# Patient Record
Sex: FEMALE | URBAN - METROPOLITAN AREA
[De-identification: names, ages, dates, MRNs, and addresses within clinical notes are randomized per-mention and may not be internally consistent; named-entity substitution may affect disease eponyms.]

---

## 2018-11-14 ENCOUNTER — INPATIENT (INPATIENT)
Facility: HOSPITAL | Age: 33
LOS: 5 days | Discharge: HOME | DRG: 65 | End: 2018-11-20
Attending: PSYCHIATRY & NEUROLOGY | Admitting: PSYCHIATRY & NEUROLOGY
Payer: MEDICAID

## 2018-11-14 VITALS
DIASTOLIC BLOOD PRESSURE: 86 MMHG | SYSTOLIC BLOOD PRESSURE: 158 MMHG | RESPIRATION RATE: 16 BRPM | HEART RATE: 72 BPM | OXYGEN SATURATION: 100 %

## 2018-11-14 DIAGNOSIS — J45.20 MILD INTERMITTENT ASTHMA, UNCOMPLICATED: ICD-10-CM

## 2018-11-14 DIAGNOSIS — I10 ESSENTIAL (PRIMARY) HYPERTENSION: ICD-10-CM

## 2018-11-14 DIAGNOSIS — I63.9 CEREBRAL INFARCTION, UNSPECIFIED: ICD-10-CM

## 2018-11-14 DIAGNOSIS — Z29.9 ENCOUNTER FOR PROPHYLACTIC MEASURES, UNSPECIFIED: ICD-10-CM

## 2018-11-14 DIAGNOSIS — Z98.891 HISTORY OF UTERINE SCAR FROM PREVIOUS SURGERY: Chronic | ICD-10-CM

## 2018-11-14 DIAGNOSIS — Z91.89 OTHER SPECIFIED PERSONAL RISK FACTORS, NOT ELSEWHERE CLASSIFIED: ICD-10-CM

## 2018-11-14 DIAGNOSIS — R63.8 OTHER SYMPTOMS AND SIGNS CONCERNING FOOD AND FLUID INTAKE: ICD-10-CM

## 2018-11-14 DIAGNOSIS — B20 HUMAN IMMUNODEFICIENCY VIRUS [HIV] DISEASE: ICD-10-CM

## 2018-11-14 LAB
ANION GAP SERPL CALC-SCNC: 20 MMOL/L — HIGH (ref 5–17)
BUN SERPL-MCNC: 11 MG/DL — SIGNIFICANT CHANGE UP (ref 7–23)
CALCIUM SERPL-MCNC: 9.8 MG/DL — SIGNIFICANT CHANGE UP (ref 8.4–10.5)
CHLORIDE SERPL-SCNC: 97 MMOL/L — SIGNIFICANT CHANGE UP (ref 96–108)
CO2 SERPL-SCNC: 19 MMOL/L — LOW (ref 22–31)
CREAT SERPL-MCNC: 0.81 MG/DL — SIGNIFICANT CHANGE UP (ref 0.5–1.3)
CRP SERPL-MCNC: <0.03 MG/DL — SIGNIFICANT CHANGE UP (ref 0–0.4)
GLUCOSE SERPL-MCNC: 74 MG/DL — SIGNIFICANT CHANGE UP (ref 70–99)
HCT VFR BLD CALC: 33.7 % — LOW (ref 34.5–45)
HGB BLD-MCNC: 10.5 G/DL — LOW (ref 11.5–15.5)
MAGNESIUM SERPL-MCNC: 2 MG/DL — SIGNIFICANT CHANGE UP (ref 1.6–2.6)
MCHC RBC-ENTMCNC: 24.8 PG — LOW (ref 27–34)
MCHC RBC-ENTMCNC: 31.2 G/DL — LOW (ref 32–36)
MCV RBC AUTO: 79.5 FL — LOW (ref 80–100)
PLATELET # BLD AUTO: 229 K/UL — SIGNIFICANT CHANGE UP (ref 150–400)
POTASSIUM SERPL-MCNC: 3.7 MMOL/L — SIGNIFICANT CHANGE UP (ref 3.5–5.3)
POTASSIUM SERPL-SCNC: 3.7 MMOL/L — SIGNIFICANT CHANGE UP (ref 3.5–5.3)
RBC # BLD: 4.24 M/UL — SIGNIFICANT CHANGE UP (ref 3.8–5.2)
RBC # FLD: 16.3 % — SIGNIFICANT CHANGE UP (ref 10.3–16.9)
SODIUM SERPL-SCNC: 136 MMOL/L — SIGNIFICANT CHANGE UP (ref 135–145)
WBC # BLD: 3.4 K/UL — LOW (ref 3.8–10.5)
WBC # FLD AUTO: 3.4 K/UL — LOW (ref 3.8–10.5)

## 2018-11-14 PROCEDURE — 99223 1ST HOSP IP/OBS HIGH 75: CPT

## 2018-11-14 PROCEDURE — 93010 ELECTROCARDIOGRAM REPORT: CPT

## 2018-11-14 RX ORDER — MULTIVIT WITH MIN/MFOLATE/K2 340-15/3 G
200 POWDER (GRAM) ORAL ONCE
Qty: 0 | Refills: 0 | Status: DISCONTINUED | OUTPATIENT
Start: 2018-11-14 | End: 2018-11-15

## 2018-11-14 RX ORDER — DOCUSATE SODIUM 100 MG
1 CAPSULE ORAL
Qty: 0 | Refills: 0 | COMMUNITY

## 2018-11-14 RX ORDER — ALBUTEROL 90 UG/1
2 AEROSOL, METERED ORAL EVERY 6 HOURS
Qty: 0 | Refills: 0 | Status: DISCONTINUED | OUTPATIENT
Start: 2018-11-14 | End: 2018-11-20

## 2018-11-14 RX ORDER — ACETAMINOPHEN 500 MG
1000 TABLET ORAL ONCE
Qty: 0 | Refills: 0 | Status: COMPLETED | OUTPATIENT
Start: 2018-11-14 | End: 2018-11-14

## 2018-11-14 RX ORDER — DIBUCAINE 1 %
1 OINTMENT (GRAM) RECTAL ONCE
Qty: 0 | Refills: 0 | Status: COMPLETED | OUTPATIENT
Start: 2018-11-14 | End: 2018-11-14

## 2018-11-14 RX ORDER — POTASSIUM CHLORIDE 20 MEQ
20 PACKET (EA) ORAL ONCE
Qty: 0 | Refills: 0 | Status: COMPLETED | OUTPATIENT
Start: 2018-11-14 | End: 2018-11-14

## 2018-11-14 RX ORDER — ENOXAPARIN SODIUM 100 MG/ML
60 INJECTION SUBCUTANEOUS ONCE
Qty: 0 | Refills: 0 | Status: COMPLETED | OUTPATIENT
Start: 2018-11-14 | End: 2018-11-14

## 2018-11-14 RX ORDER — DOCUSATE SODIUM 100 MG
100 CAPSULE ORAL THREE TIMES A DAY
Qty: 0 | Refills: 0 | Status: DISCONTINUED | OUTPATIENT
Start: 2018-11-14 | End: 2018-11-20

## 2018-11-14 RX ORDER — LANOLIN ALCOHOL/MO/W.PET/CERES
3 CREAM (GRAM) TOPICAL ONCE
Qty: 0 | Refills: 0 | Status: COMPLETED | OUTPATIENT
Start: 2018-11-14 | End: 2018-11-14

## 2018-11-14 RX ORDER — POLYETHYLENE GLYCOL 3350 17 G/17G
17 POWDER, FOR SOLUTION ORAL EVERY 12 HOURS
Qty: 0 | Refills: 0 | Status: DISCONTINUED | OUTPATIENT
Start: 2018-11-14 | End: 2018-11-20

## 2018-11-14 RX ORDER — ELVITEGRAVIR, COBICISTAT, EMTRICITABINE, AND TENOFOVIR ALAFENAMIDE 150; 150; 200; 10 MG/1; MG/1; MG/1; MG/1
1 TABLET ORAL DAILY
Qty: 0 | Refills: 0 | Status: DISCONTINUED | OUTPATIENT
Start: 2018-11-15 | End: 2018-11-20

## 2018-11-14 RX ORDER — ATORVASTATIN CALCIUM 80 MG/1
80 TABLET, FILM COATED ORAL AT BEDTIME
Qty: 0 | Refills: 0 | Status: DISCONTINUED | OUTPATIENT
Start: 2018-11-14 | End: 2018-11-20

## 2018-11-14 RX ORDER — ELVITEGRAVIR, COBICISTAT, EMTRICITABINE, AND TENOFOVIR ALAFENAMIDE 150; 150; 200; 10 MG/1; MG/1; MG/1; MG/1
1 TABLET ORAL
Qty: 0 | Refills: 0 | COMMUNITY

## 2018-11-14 RX ORDER — ACETAMINOPHEN 500 MG
650 TABLET ORAL ONCE
Qty: 0 | Refills: 0 | Status: COMPLETED | OUTPATIENT
Start: 2018-11-14 | End: 2018-11-14

## 2018-11-14 RX ORDER — AMLODIPINE BESYLATE 2.5 MG/1
5 TABLET ORAL DAILY
Qty: 0 | Refills: 0 | Status: DISCONTINUED | OUTPATIENT
Start: 2018-11-15 | End: 2018-11-20

## 2018-11-14 RX ORDER — HEPARIN SODIUM 5000 [USP'U]/ML
5000 INJECTION INTRAVENOUS; SUBCUTANEOUS EVERY 8 HOURS
Qty: 0 | Refills: 0 | Status: DISCONTINUED | OUTPATIENT
Start: 2018-11-14 | End: 2018-11-14

## 2018-11-14 RX ORDER — VALACYCLOVIR 500 MG/1
500 TABLET, FILM COATED ORAL DAILY
Qty: 0 | Refills: 0 | Status: DISCONTINUED | OUTPATIENT
Start: 2018-11-14 | End: 2018-11-15

## 2018-11-14 RX ORDER — AMLODIPINE BESYLATE 2.5 MG/1
1 TABLET ORAL
Qty: 0 | Refills: 0 | COMMUNITY

## 2018-11-14 RX ORDER — SENNA PLUS 8.6 MG/1
2 TABLET ORAL AT BEDTIME
Qty: 0 | Refills: 0 | Status: DISCONTINUED | OUTPATIENT
Start: 2018-11-14 | End: 2018-11-20

## 2018-11-14 RX ORDER — ASPIRIN/CALCIUM CARB/MAGNESIUM 324 MG
81 TABLET ORAL DAILY
Qty: 0 | Refills: 0 | Status: DISCONTINUED | OUTPATIENT
Start: 2018-11-14 | End: 2018-11-17

## 2018-11-14 RX ADMIN — Medication 1000 MILLIGRAM(S): at 22:27

## 2018-11-14 RX ADMIN — ATORVASTATIN CALCIUM 80 MILLIGRAM(S): 80 TABLET, FILM COATED ORAL at 21:52

## 2018-11-14 RX ADMIN — Medication 20 MILLIEQUIVALENT(S): at 18:19

## 2018-11-14 RX ADMIN — Medication 1000 MILLIGRAM(S): at 22:28

## 2018-11-14 RX ADMIN — ENOXAPARIN SODIUM 60 MILLIGRAM(S): 100 INJECTION SUBCUTANEOUS at 19:38

## 2018-11-14 RX ADMIN — POLYETHYLENE GLYCOL 3350 17 GRAM(S): 17 POWDER, FOR SOLUTION ORAL at 18:19

## 2018-11-14 RX ADMIN — Medication 3 MILLIGRAM(S): at 22:28

## 2018-11-14 RX ADMIN — Medication 1 APPLICATION(S): at 22:49

## 2018-11-14 NOTE — H&P ADULT - PMH
Essential hypertension    HIV (human immunodeficiency virus infection)    Mild intermittent asthma without complication    Stroke  in 2013 x2

## 2018-11-14 NOTE — H&P ADULT - NSHPLABSRESULTS_GEN_ALL_CORE
.  LABS:                         RADIOLOGY, EKG & ADDITIONAL TESTS: Reviewed. .  LABS:     11-14    136  |  97  |  11  ----------------------------<  74  3.7   |  19<L>  |  0.81    Ca    9.8      14 Nov 2018 16:44  Mg     2.0     11-14                    RADIOLOGY, EKG & ADDITIONAL TESTS: Reviewed.

## 2018-11-14 NOTE — H&P ADULT - PROBLEM SELECTOR PLAN 1
Admitted 2 days ago to [place] with headache that has since resolved. 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 According to pt, no new deficits. Hx of 2 strokes in 2013.  - Echo at [facility] showed normal EF, no evidence of thrombus  - PT/OT consulted  - will c/w atorva 80 in place of rosuvastatin  - EKG unremarkable  - c/w ASA Admitted 2 days ago to [place] with headache that has since resolved. 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 According to pt, no new deficits. Hx of 2 strokes in 2013.  - Echo at [place] showed normal EF, no evidence of thrombus  - PT/OT consulted  - will c/w atorva 80 in place of rosuvastatin  - EKG unremarkable  - c/w ASA  - 1mg/kg of lovenox sQ Admitted 2 days ago to [place] with headache that has since resolved.  NIHH 7 . 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 According to pt, no new deficits. Hx of 2 strokes in 2013.  - Echo at [place] showed normal EF, no evidence of thrombus  - PT/OT consulted  - will c/w atorva 80 in place of rosuvastatin  - EKG unremarkable  - c/w ASA  - 1mg/kg of lovenox sQ

## 2018-11-14 NOTE — H&P ADULT - ASSESSMENT
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

## 2018-11-14 NOTE — H&P ADULT - PROBLEM SELECTOR PLAN 7
1) PCP Contacted on Admission: (Y/N) --> Dr. Jf Ernst (HIV)  [phone]  2) Date of Contact with PCP: Office closed  3) PCP Contacted at Discharge: (Y/N)  4) Summary of Handoff Given to PCP:   5) Post-Discharge Appointment Date and Location:
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
## 2018-11-14 NOTE — H&P ADULT - PROBLEM SELECTOR PLAN 6
DVT PPx: HSQ + SCDs  GI PPx: Not indicated    Dispo: [facility] for stroke workup DVT PPx: Lovenox + SCDs  GI PPx: Not indicated    Dispo: [facility] for stroke workup

## 2018-11-14 NOTE — H&P ADULT - NSHPSOCIALHISTORY_GEN_ALL_CORE
.  Tobacco use:   EtOH use:  Illicit drug use:    Living situation: .  Tobacco use: None  EtOH use: Denies  Illicit drug use: Marijuana, denies IVDU    Living situation: with boyfriend

## 2018-11-14 NOTE — H&P ADULT - NSHPPHYSICALEXAM_GEN_ALL_CORE
VITAL SIGNS:  T(C): --  T(F): --  HR: 72 (11-14-18 @ 14:49) (72 - 72)  BP: 158/86 (11-14-18 @ 14:49) (158/86 - 158/86)  BP(mean): 112 (11-14-18 @ 14:49) (112 - 112)  RR: 16 (11-14-18 @ 14:49) (16 - 16)  SpO2: 100% (11-14-18 @ 14:49) (100% - 100%)  Wt(kg): --  PHYSICAL EXAM:  Constitutional: WDWN resting comfortably in bed; NAD  Head: NC/AT  Eyes: PERRL, EOMI, anicteric sclera  ENT: no nasal discharge; uvula midline, no oropharyngeal erythema or exudates; MMM  Neck: supple; no JVD or thyromegaly  Respiratory: CTA B/L; no W/R/R, no retractions  Cardiac: +S1/S2; RRR; no M/R/G; PMI non-displaced  Gastrointestinal: abdomen soft, NT/ND; no rebound or guarding; +BSx4  Extremities: WWP, no clubbing or cyanosis; no peripheral edema  Musculoskeletal: NROM x4; no joint swelling, tenderness or erythema  Vascular: 2+ radial, femoral, DP/PT pulses B/L  Dermatologic: skin warm, dry and intact; no rashes, wounds, or scars  Lymphatic: no submandibular or cervical LAD  Neurologic:   - Mental Status:  AAOx3; speech is fluent with intact naming, repetition, and comprehension  - Cranial Nerves II-XII:    II:  PERRLA; visual fields are full to confrontation  III, IV, VI:  EOMI, no nystagmus  V:  facial sensation is intact in the V1-V3 distribution bilaterally.  VII:  face is symmetric with normal eye closure and smile  VIII:  hearing is intact to finger rub  IX, X:  uvula is midline and soft palate rises symmetrically  XI:  head turning and shoulder shrug are intact bilaterally  XII:  tongue protrudes in the midline  - Motor:  strength is 5/5 throughout; normal muscle bulk and tone throughout; no pronator drift  - Reflexes:  2+ and symmetric at the biceps, triceps, brachioradialis, knees, and ankles;  plantar reflexes downgoing bilaterally  - Sensory:  intact to light touch, pin prick, vibration, and joint-position sense throughout  - Coordination:  finger-nose-finger and heel-knee-shin intact without dysmetria; rapid alternating hand movements intact  - Gait:  normal steps, base, arm swing, and turning; heel and toe walking are normal; tandem gait is normal; Romberg testing is negative VITAL SIGNS:  T(C): --  T(F): --  HR: 72 (11-14-18 @ 14:49) (72 - 72)  BP: 158/86 (11-14-18 @ 14:49) (158/86 - 158/86)  BP(mean): 112 (11-14-18 @ 14:49) (112 - 112)  RR: 16 (11-14-18 @ 14:49) (16 - 16)  SpO2: 100% (11-14-18 @ 14:49) (100% - 100%)  Wt(kg): --  PHYSICAL EXAM:  Constitutional: WDWN resting comfortably in bed; NAD  Head: NC/AT  Eyes: PERRL, EOMI, anicteric sclera  ENT: no nasal discharge; uvula midline, no oropharyngeal erythema or exudates; MMM  Neck: supple; no JVD or thyromegaly  Respiratory: CTA B/L; no W/R/R, no retractions  Cardiac: +S1/S2; RRR; no M/R/G; PMI non-displaced  Gastrointestinal: abdomen soft, NT/ND; no rebound or guarding; +BSx4  Extremities: WWP, no clubbing or cyanosis; no peripheral edema  Musculoskeletal: NROM x4; no joint swelling, tenderness or erythema  Vascular: 2+ radial, femoral, DP/PT pulses B/L  Dermatologic: skin warm, dry and intact; no rashes, wounds, or scars  Lymphatic: no submandibular or cervical LAD  Neurologic:   - Mental Status:  AAOx3; speech is fluent with intact naming, repetition, and comprehension. Mild dysarthria  - Cranial Nerves II-XII:    II:  PERRLA; visual fields are full to confrontation  III, IV, VI:  EOMI, no nystagmus  V:  facial sensation is intact in the V1-V3 distribution bilaterally.  VII:  Mild right sided facial droop evident on smile  VIII:  hearing is intact to finger rub  IX, X:  uvula is midline and soft palate rises symmetrically  XI:  head turning and shoulder shrug are intact bilaterally  XII:  tongue protrudes in the midline  - Motor:  strength is 5/5 on L side. strength 1/5 throughout RLE. strength 2/5 on RUE, 0/5 in R hand.  normal muscle bulk and tone throughout; no pronator drift  - Reflexes:  plantar reflexes downgoing bilaterally  - Sensory:  intact to light touch, pin prick, vibration, and joint-position sense throughout  - Coordination:  finger-nose-finger and heel-knee-shin intact without dysmetria on L side; rapid alternating hand movements intact on L.  - Gait:  Gait assessment deferred.

## 2018-11-15 PROCEDURE — 99232 SBSQ HOSP IP/OBS MODERATE 35: CPT

## 2018-11-15 RX ORDER — ENOXAPARIN SODIUM 100 MG/ML
60 INJECTION SUBCUTANEOUS ONCE
Qty: 0 | Refills: 0 | Status: COMPLETED | OUTPATIENT
Start: 2018-11-15 | End: 2018-11-15

## 2018-11-15 RX ORDER — ONDANSETRON 8 MG/1
4 TABLET, FILM COATED ORAL ONCE
Qty: 0 | Refills: 0 | Status: COMPLETED | OUTPATIENT
Start: 2018-11-15 | End: 2018-11-15

## 2018-11-15 RX ORDER — LANOLIN ALCOHOL/MO/W.PET/CERES
3 CREAM (GRAM) TOPICAL ONCE
Qty: 0 | Refills: 0 | Status: COMPLETED | OUTPATIENT
Start: 2018-11-15 | End: 2018-11-16

## 2018-11-15 RX ORDER — VALACYCLOVIR 500 MG/1
1000 TABLET, FILM COATED ORAL
Qty: 0 | Refills: 0 | Status: DISCONTINUED | OUTPATIENT
Start: 2018-11-15 | End: 2018-11-20

## 2018-11-15 RX ORDER — ENOXAPARIN SODIUM 100 MG/ML
60 INJECTION SUBCUTANEOUS AT BEDTIME
Qty: 0 | Refills: 0 | Status: DISCONTINUED | OUTPATIENT
Start: 2018-11-15 | End: 2018-11-15

## 2018-11-15 RX ADMIN — ENOXAPARIN SODIUM 60 MILLIGRAM(S): 100 INJECTION SUBCUTANEOUS at 20:12

## 2018-11-15 RX ADMIN — Medication 10 MILLIGRAM(S): at 11:22

## 2018-11-15 RX ADMIN — ATORVASTATIN CALCIUM 80 MILLIGRAM(S): 80 TABLET, FILM COATED ORAL at 22:39

## 2018-11-15 RX ADMIN — Medication 100 MILLIGRAM(S): at 22:39

## 2018-11-15 RX ADMIN — Medication 81 MILLIGRAM(S): at 11:22

## 2018-11-15 RX ADMIN — ELVITEGRAVIR, COBICISTAT, EMTRICITABINE, AND TENOFOVIR ALAFENAMIDE 1 TABLET(S): 150; 150; 200; 10 TABLET ORAL at 11:22

## 2018-11-15 RX ADMIN — AMLODIPINE BESYLATE 5 MILLIGRAM(S): 2.5 TABLET ORAL at 06:23

## 2018-11-15 RX ADMIN — VALACYCLOVIR 1000 MILLIGRAM(S): 500 TABLET, FILM COATED ORAL at 18:57

## 2018-11-15 RX ADMIN — SENNA PLUS 2 TABLET(S): 8.6 TABLET ORAL at 22:38

## 2018-11-15 RX ADMIN — POLYETHYLENE GLYCOL 3350 17 GRAM(S): 17 POWDER, FOR SOLUTION ORAL at 18:57

## 2018-11-15 RX ADMIN — ONDANSETRON 4 MILLIGRAM(S): 8 TABLET, FILM COATED ORAL at 20:12

## 2018-11-15 RX ADMIN — VALACYCLOVIR 500 MILLIGRAM(S): 500 TABLET, FILM COATED ORAL at 11:22

## 2018-11-15 NOTE — PHYSICAL THERAPY INITIAL EVALUATION ADULT - GAIT DEVIATIONS NOTED, PT EVAL
decreased step length/decreased stride length/RLE buckling, lateral trunk sway, decreased gait speed

## 2018-11-15 NOTE — PROGRESS NOTE ADULT - PROBLEM SELECTOR PLAN 6
DVT PPx: Lovenox + SCDs  GI PPx: Not indicated    Dispo: [facility] for stroke workup DVT PPx: SCDs + Lovenox  GI PPx: Not indicated    Dispo: [facility] for stroke workup

## 2018-11-15 NOTE — PHYSICAL THERAPY INITIAL EVALUATION ADULT - PERTINENT HX OF CURRENT PROBLEM, REHAB EVAL
Sydnee Ennis is a 33F w PMH of HIV, HTN, CVA x2 (2013) with residual dysarthria, R sided facial droop, and R sided upper/lower extremity weakness, and asthma, who was transferred from [facility] for further stroke workup and intervention. Patient originally presented to [facility] for a severe frontal headache

## 2018-11-15 NOTE — PROGRESS NOTE ADULT - ASSESSMENT
Sydnee Ennis is a 33F w PMH of HIV, HTN, CVA x2 (2013) with residual dysarthria, R sided facial droop, and R sided upper/lower extremity weakness, and asthma, who was transferred from 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

## 2018-11-15 NOTE — PHYSICAL THERAPY INITIAL EVALUATION ADULT - GAIT DISTANCE, PT EVAL
5 ft x 2, patient refusing further distance secondary to not having quad cane and fear of RLE buckling
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
## 2018-11-15 NOTE — PROGRESS NOTE ADULT - PROBLEM SELECTOR PLAN 1
Admitted 2 days ago to [place] with headache that has since resolved. 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 According to pt, no new deficits. Hx of 2 strokes in 2013.  - Echo at [place] showed normal EF, no evidence of thrombus  - PT/OT consulted  - will c/w atorva 80 in place of rosuvastatin  - EKG unremarkable  - c/w ASA  - 1mg/kg of lovenox sQ Admitted 2 days ago to [place] with headache that has since resolved. 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 According to pt, no new deficits. Hx of 2 strokes in 2013.  - Will go for cerebral angio tomorrow. Is medically optimized for surg.  - Echo at [place] showed normal EF, no evidence of thrombus  - PT/OT consulted  - will c/w atorva 80 in place of rosuvastatin  - EKG unremarkable  - c/w ASA  - 1mg/kg of lovenox sQ

## 2018-11-15 NOTE — PROGRESS NOTE ADULT - SUBJECTIVE AND OBJECTIVE BOX
Surgery: diagnostic cerebral angiogram   Consent: pending    penicillin (Hives)      T(C): 36.8 (11-15-18 @ 13:16), Max: 36.9 (11-14-18 @ 22:00)  HR: 74 (11-15-18 @ 12:40) (52 - 84)  BP: 129/93 (11-15-18 @ 12:40) (112/74 - 156/98)  RR: 16 (11-15-18 @ 12:40) (16 - 22)  SpO2: 100% (11-15-18 @ 12:40) (93% - 100%)  Wt(kg): --      11-14    136  |  97  |  11  ----------------------------<  74  3.7   |  19<L>  |  0.81    Ca    9.8      14 Nov 2018 16:44  Mg     2.0     11-14      CBC Full  -  ( 14 Nov 2018 18:47 )  WBC Count : 3.4 K/uL  Hemoglobin : 10.5 g/dL  Hematocrit : 33.7 %  Platelet Count - Automated : 229 K/uL  Mean Cell Volume : 79.5 fL  Mean Cell Hemoglobin : 24.8 pg  Mean Cell Hemoglobin Concentration : 31.2 g/dL  Auto Neutrophil # : x  Auto Lymphocyte # : x  Auto Monocyte # : x  Auto Eosinophil # : x  Auto Basophil # : x  Auto Neutrophil % : x  Auto Lymphocyte % : x  Auto Monocyte % : x  Auto Eosinophil % : x  Auto Basophil % : x      Pregnancy test: pending   Type & Screen (in past 72hrs): pending     CXR: pending   EKG: pending  Medical Clearances:    Last dose of antiplatelet/anticoagulation drug:    Implanted Devices (pacemaker, drug pump...etc):  []YES   [] NO                  If yes --> EPS consulted to interrogate/adjust device:                 Assessment:   34 y/o female with PMH HIV, HTN, CVA x2 with residual right facial, RUE and RLE weakness, dysarthria     Plan:  - cerebral angio tomorrow to r/o vasculitis  - neuro checks  - vitals checks  - pain control  - NPO at midnight, IVF while NPO  - needs pregnancy test, type and screen, baseline CXR and EKG   - continue Aspirin as per Dr. Olmstead   - DVT ppx: SCD's   - needs consent form     d/w Dr. Olmstead Surgery: diagnostic cerebral angiogram   Consent: pending    penicillin (Hives)      T(C): 36.8 (11-15-18 @ 13:16), Max: 36.9 (11-14-18 @ 22:00)  HR: 74 (11-15-18 @ 12:40) (52 - 84)  BP: 129/93 (11-15-18 @ 12:40) (112/74 - 156/98)  RR: 16 (11-15-18 @ 12:40) (16 - 22)  SpO2: 100% (11-15-18 @ 12:40) (93% - 100%)  Wt(kg): --      11-14    136  |  97  |  11  ----------------------------<  74  3.7   |  19<L>  |  0.81    Ca    9.8      14 Nov 2018 16:44  Mg     2.0     11-14      CBC Full  -  ( 14 Nov 2018 18:47 )  WBC Count : 3.4 K/uL  Hemoglobin : 10.5 g/dL  Hematocrit : 33.7 %  Platelet Count - Automated : 229 K/uL  Mean Cell Volume : 79.5 fL  Mean Cell Hemoglobin : 24.8 pg  Mean Cell Hemoglobin Concentration : 31.2 g/dL  Auto Neutrophil # : x  Auto Lymphocyte # : x  Auto Monocyte # : x  Auto Eosinophil # : x  Auto Basophil # : x  Auto Neutrophil % : x  Auto Lymphocyte % : x  Auto Monocyte % : x  Auto Eosinophil % : x  Auto Basophil % : x      Pregnancy test: pending   Type & Screen (in past 72hrs): pending     CXR: pending   EKG: pending  Medical Clearances: medically optimized by primary team     Last dose of antiplatelet/anticoagulation drug: Aspirin 81mg at 11am 11/15      Implanted Devices (pacemaker, drug pump...etc):  []YES   [x] NO                  If yes --> EPS consulted to interrogate/adjust device:                 Assessment:   34 y/o female with PMH HIV, HTN, CVA x2 with residual right facial, RUE and RLE weakness, dysarthria     Plan:  - cerebral angio tomorrow to r/o vasculitis  - neuro checks  - vitals checks  - pain control  - NPO at midnight, IVF while NPO  - needs pregnancy test, type and screen, baseline CXR and EKG   - continue Aspirin as per Dr. Olmstead   - DVT ppx: SCD's   - needs consent form     d/w Dr. Olmstead Surgery: diagnostic cerebral angiogram   Consent: pending    penicillin (Hives)      T(C): 36.8 (11-15-18 @ 13:16), Max: 36.9 (11-14-18 @ 22:00)  HR: 74 (11-15-18 @ 12:40) (52 - 84)  BP: 129/93 (11-15-18 @ 12:40) (112/74 - 156/98)  RR: 16 (11-15-18 @ 12:40) (16 - 22)  SpO2: 100% (11-15-18 @ 12:40) (93% - 100%)  Wt(kg): --      11-14    136  |  97  |  11  ----------------------------<  74  3.7   |  19<L>  |  0.81    Ca    9.8      14 Nov 2018 16:44  Mg     2.0     11-14      CBC Full  -  ( 14 Nov 2018 18:47 )  WBC Count : 3.4 K/uL  Hemoglobin : 10.5 g/dL  Hematocrit : 33.7 %  Platelet Count - Automated : 229 K/uL  Mean Cell Volume : 79.5 fL  Mean Cell Hemoglobin : 24.8 pg  Mean Cell Hemoglobin Concentration : 31.2 g/dL  Auto Neutrophil # : x  Auto Lymphocyte # : x  Auto Monocyte # : x  Auto Eosinophil # : x  Auto Basophil # : x  Auto Neutrophil % : x  Auto Lymphocyte % : x  Auto Monocyte % : x  Auto Eosinophil % : x  Auto Basophil % : x      Pregnancy test: pending   Type & Screen (in past 72hrs): pending     CXR: pending   EKG: pending  Medical Clearances: pending clearance by primary team  Last dose of antiplatelet/anticoagulation drug: Aspirin 81mg at 11am 11/15      Implanted Devices (pacemaker, drug pump...etc):  []YES   [x] NO                  If yes --> EPS consulted to interrogate/adjust device:                 Assessment:   32 y/o female with PMH HIV, HTN, CVA x2 with residual right facial, RUE and RLE weakness, dysarthria     Plan:  - cerebral angio tomorrow to r/o vasculitis  - neuro checks  - vitals checks  - pain control  - NPO at midnight, IVF while NPO  - needs pregnancy test, type and screen, baseline CXR and EKG   - continue Aspirin as per Dr. Olmstead   - DVT ppx: SCD's   - needs consent form     d/w Dr. Olmstead

## 2018-11-15 NOTE — PROGRESS NOTE ADULT - SUBJECTIVE AND OBJECTIVE BOX
OVERNIGHT EVENTS:    SUBJECTIVE / INTERVAL HPI: Patient seen and examined at bedside.     VITAL SIGNS:  Vital Signs Last 24 Hrs  T(C): 36.8 (15 Nov 2018 05:06), Max: 36.9 (14 Nov 2018 22:00)  T(F): 98.2 (15 Nov 2018 05:06), Max: 98.4 (14 Nov 2018 22:00)  HR: 58 (15 Nov 2018 04:01) (52 - 84)  BP: 112/74 (15 Nov 2018 04:01) (112/74 - 158/86)  BP(mean): 88 (15 Nov 2018 04:01) (88 - 119)  RR: 22 (15 Nov 2018 04:01) (16 - 22)  SpO2: 98% (15 Nov 2018 04:01) (93% - 100%)    PHYSICAL EXAM:    General: WDWN  HEENT: NC/AT; PERRL, anicteric sclera; MMM  Neck: supple  Cardiovascular: +S1/S2, RRR  Respiratory: CTA B/L; no W/R/R  Gastrointestinal: soft, NT/ND; +BSx4  Extremities: WWP; no edema, clubbing or cyanosis  Vascular: 2+ radial, DP/PT pulses B/L  Neurological: AAOx3; no focal deficits    MEDICATIONS:  MEDICATIONS  (STANDING):  amLODIPine   Tablet 5 milliGRAM(s) Oral daily  aspirin enteric coated 81 milliGRAM(s) Oral daily  atorvastatin 80 milliGRAM(s) Oral at bedtime  docusate sodium 100 milliGRAM(s) Oral three times a day  magnesium citrate Solution 200 milliLiter(s) Oral once  polyethylene glycol 3350 17 Gram(s) Oral every 12 hours  senna 2 Tablet(s) Oral at bedtime  tenofovir alafenamide 10 mG/bhrobrkiuzwa996 mG/cobicistat 150 mG/emtricitabine 200 mG (GENVOYA) 1 Tablet(s) Oral daily  valACYclovir 500 milliGRAM(s) Oral daily    MEDICATIONS  (PRN):  ALBUTerol    90 MICROgram(s) HFA Inhaler 2 Puff(s) Inhalation every 6 hours PRN Shortness of Breath and/or Wheezing  bisacodyl Suppository 10 milliGRAM(s) Rectal daily PRN Constipation      ALLERGIES:  Allergies    penicillin (Hives)    Intolerances        LABS:                        10.5   3.4   )-----------( 229      ( 14 Nov 2018 18:47 )             33.7     11-14    136  |  97  |  11  ----------------------------<  74  3.7   |  19<L>  |  0.81    Ca    9.8      14 Nov 2018 16:44  Mg     2.0     11-14          CAPILLARY BLOOD GLUCOSE          RADIOLOGY & ADDITIONAL TESTS: Reviewed. OVERNIGHT EVENTS: CHESTER, topical cream given for rectal discomfort 2/2 hemorrhoids and constipation    SUBJECTIVE / INTERVAL HPI: Patient seen and examined at bedside. Upset that she is being kept NPO for possible angiogram. Complaining of constipation.     VITAL SIGNS:  Vital Signs Last 24 Hrs  T(C): 36.8 (15 Nov 2018 05:06), Max: 36.9 (14 Nov 2018 22:00)  T(F): 98.2 (15 Nov 2018 05:06), Max: 98.4 (14 Nov 2018 22:00)  HR: 58 (15 Nov 2018 04:01) (52 - 84)  BP: 112/74 (15 Nov 2018 04:01) (112/74 - 158/86)  BP(mean): 88 (15 Nov 2018 04:01) (88 - 119)  RR: 22 (15 Nov 2018 04:01) (16 - 22)  SpO2: 98% (15 Nov 2018 04:01) (93% - 100%)    PHYSICAL EXAM:    General: WDWN  HEENT: NC/AT; PERRL, anicteric sclera; MMM  Neck: supple  Cardiovascular: +S1/S2, RRR  Respiratory: CTA B/L; no W/R/R  Gastrointestinal: soft, NT/ND; +BSx4  Extremities: WWP; no edema, clubbing or cyanosis  Vascular: 2+ radial, DP/PT pulses B/L  Neurological: AAOx3; no focal deficits    MEDICATIONS:  MEDICATIONS  (STANDING):  amLODIPine   Tablet 5 milliGRAM(s) Oral daily  aspirin enteric coated 81 milliGRAM(s) Oral daily  atorvastatin 80 milliGRAM(s) Oral at bedtime  docusate sodium 100 milliGRAM(s) Oral three times a day  magnesium citrate Solution 200 milliLiter(s) Oral once  polyethylene glycol 3350 17 Gram(s) Oral every 12 hours  senna 2 Tablet(s) Oral at bedtime  tenofovir alafenamide 10 mG/qhchuormyfed562 mG/cobicistat 150 mG/emtricitabine 200 mG (GENVOYA) 1 Tablet(s) Oral daily  valACYclovir 500 milliGRAM(s) Oral daily    MEDICATIONS  (PRN):  ALBUTerol    90 MICROgram(s) HFA Inhaler 2 Puff(s) Inhalation every 6 hours PRN Shortness of Breath and/or Wheezing  bisacodyl Suppository 10 milliGRAM(s) Rectal daily PRN Constipation      ALLERGIES:  Allergies    penicillin (Hives)    Intolerances        LABS:                        10.5   3.4   )-----------( 229      ( 14 Nov 2018 18:47 )             33.7     11-14    136  |  97  |  11  ----------------------------<  74  3.7   |  19<L>  |  0.81    Ca    9.8      14 Nov 2018 16:44  Mg     2.0     11-14          CAPILLARY BLOOD GLUCOSE          RADIOLOGY & ADDITIONAL TESTS: Reviewed. OVERNIGHT EVENTS: CHESTER, topical cream given for rectal discomfort 2/2 hemorrhoids and constipation    SUBJECTIVE / INTERVAL HPI: Patient seen and examined at bedside. Upset that she is being kept NPO for possible angiogram. Complaining of constipation.     VITAL SIGNS:  Vital Signs Last 24 Hrs  T(C): 36.8 (15 Nov 2018 05:06), Max: 36.9 (14 Nov 2018 22:00)  T(F): 98.2 (15 Nov 2018 05:06), Max: 98.4 (14 Nov 2018 22:00)  HR: 58 (15 Nov 2018 04:01) (52 - 84)  BP: 112/74 (15 Nov 2018 04:01) (112/74 - 158/86)  BP(mean): 88 (15 Nov 2018 04:01) (88 - 119)  RR: 22 (15 Nov 2018 04:01) (16 - 22)  SpO2: 98% (15 Nov 2018 04:01) (93% - 100%)    PHYSICAL EXAM:    Constitutional: WDWN resting comfortably in bed; NAD  Head: NC/AT  Eyes: PERRL, EOMI, anicteric sclera  ENT: no nasal discharge; uvula midline, no oropharyngeal erythema or exudates; MMM  Neck: supple; no JVD or thyromegaly  Respiratory: CTA B/L; no W/R/R, no retractions  Cardiac: +S1/S2; RRR; no M/R/G; PMI non-displaced  Gastrointestinal: abdomen soft, NT/ND; no rebound or guarding; +BSx4  Extremities: WWP, no clubbing or cyanosis; no peripheral edema  Musculoskeletal: NROM x4; no joint swelling, tenderness or erythema  Vascular: 2+ radial, femoral, DP/PT pulses B/L  Dermatologic: skin warm, dry and intact; no rashes, wounds, or scars  Lymphatic: no submandibular or cervical LAD  Neurologic:   - Mental Status:  AAOx3; speech is fluent with intact naming, repetition, and comprehension. Mild dysarthria  - Cranial Nerves II-XII:    II:  PERRLA; visual fields are full to confrontation  III, IV, VI:  EOMI, no nystagmus  V:  facial sensation is intact in the V1-V3 distribution bilaterally.  VII:  Very slight right sided facial droop evident on smile  VIII:  hearing is intact to finger rub  IX, X:  uvula is midline and soft palate rises symmetrically  XI:  head turning and shoulder shrug are intact bilaterally  XII:  tongue protrudes in the midline  - Motor:  strength is 5/5 on L side. strength 1/5 throughout RLE. strength 3-4/5 on RUE, 0/5 in R hand.  normal muscle bulk and tone throughout; no pronator drift  - Reflexes:  plantar reflexes downgoing bilaterally  - Sensory:  intact to light touch, pin prick, vibration, and joint-position sense throughout  - Coordination:  finger-nose-finger and heel-knee-shin intact without dysmetria on L side; rapid alternating hand movements intact on L.  - Gait:  Gait assessment deferred.    MEDICATIONS:  MEDICATIONS  (STANDING):  amLODIPine   Tablet 5 milliGRAM(s) Oral daily  aspirin enteric coated 81 milliGRAM(s) Oral daily  atorvastatin 80 milliGRAM(s) Oral at bedtime  docusate sodium 100 milliGRAM(s) Oral three times a day  magnesium citrate Solution 200 milliLiter(s) Oral once  polyethylene glycol 3350 17 Gram(s) Oral every 12 hours  senna 2 Tablet(s) Oral at bedtime  tenofovir alafenamide 10 mG/dgyhxterobly079 mG/cobicistat 150 mG/emtricitabine 200 mG (GENVOYA) 1 Tablet(s) Oral daily  valACYclovir 500 milliGRAM(s) Oral daily    MEDICATIONS  (PRN):  ALBUTerol    90 MICROgram(s) HFA Inhaler 2 Puff(s) Inhalation every 6 hours PRN Shortness of Breath and/or Wheezing  bisacodyl Suppository 10 milliGRAM(s) Rectal daily PRN Constipation      ALLERGIES:  Allergies    penicillin (Hives)    Intolerances        LABS:                        10.5   3.4   )-----------( 229      ( 14 Nov 2018 18:47 )             33.7     11-14    136  |  97  |  11  ----------------------------<  74  3.7   |  19<L>  |  0.81    Ca    9.8      14 Nov 2018 16:44  Mg     2.0     11-14          CAPILLARY BLOOD GLUCOSE          RADIOLOGY & ADDITIONAL TESTS: Reviewed.

## 2018-11-15 NOTE — PHYSICAL THERAPY INITIAL EVALUATION ADULT - ADDITIONAL COMMENTS
Patient lives in elevator apartment, no steps to enter. Patient with RUE contracture and RLE buckling from prior CVA. Patient denies history of recent falls outside of recent fall with her boyfriend. Patient denies home health assistance.

## 2018-11-16 LAB
ANION GAP SERPL CALC-SCNC: 14 MMOL/L — SIGNIFICANT CHANGE UP (ref 5–17)
APTT BLD: 35.2 SEC — SIGNIFICANT CHANGE UP (ref 27.5–36.3)
BASOPHILS NFR BLD AUTO: 0.4 % — SIGNIFICANT CHANGE UP (ref 0–2)
BUN SERPL-MCNC: 12 MG/DL — SIGNIFICANT CHANGE UP (ref 7–23)
CALCIUM SERPL-MCNC: 9.8 MG/DL — SIGNIFICANT CHANGE UP (ref 8.4–10.5)
CHLORIDE SERPL-SCNC: 96 MMOL/L — SIGNIFICANT CHANGE UP (ref 96–108)
CO2 SERPL-SCNC: 25 MMOL/L — SIGNIFICANT CHANGE UP (ref 22–31)
CREAT SERPL-MCNC: 0.82 MG/DL — SIGNIFICANT CHANGE UP (ref 0.5–1.3)
EOSINOPHIL NFR BLD AUTO: 0.4 % — SIGNIFICANT CHANGE UP (ref 0–6)
GLUCOSE SERPL-MCNC: 85 MG/DL — SIGNIFICANT CHANGE UP (ref 70–99)
HCG SERPL-ACNC: <.1 MIU/ML — SIGNIFICANT CHANGE UP
HCT VFR BLD CALC: 39.1 % — SIGNIFICANT CHANGE UP (ref 34.5–45)
HGB BLD-MCNC: 12.2 G/DL — SIGNIFICANT CHANGE UP (ref 11.5–15.5)
INR BLD: 1.06 — SIGNIFICANT CHANGE UP (ref 0.88–1.16)
LYMPHOCYTES # BLD AUTO: 37.9 % — SIGNIFICANT CHANGE UP (ref 13–44)
MAGNESIUM SERPL-MCNC: 1.9 MG/DL — SIGNIFICANT CHANGE UP (ref 1.6–2.6)
MCHC RBC-ENTMCNC: 24.8 PG — LOW (ref 27–34)
MCHC RBC-ENTMCNC: 31.2 G/DL — LOW (ref 32–36)
MCV RBC AUTO: 79.6 FL — LOW (ref 80–100)
MONOCYTES NFR BLD AUTO: 10.4 % — SIGNIFICANT CHANGE UP (ref 2–14)
NEUTROPHILS NFR BLD AUTO: 50.9 % — SIGNIFICANT CHANGE UP (ref 43–77)
PLATELET # BLD AUTO: 220 K/UL — SIGNIFICANT CHANGE UP (ref 150–400)
POTASSIUM SERPL-MCNC: 3.9 MMOL/L — SIGNIFICANT CHANGE UP (ref 3.5–5.3)
POTASSIUM SERPL-SCNC: 3.9 MMOL/L — SIGNIFICANT CHANGE UP (ref 3.5–5.3)
PROTHROM AB SERPL-ACNC: 12 SEC — SIGNIFICANT CHANGE UP (ref 10–12.9)
RBC # BLD: 4.91 M/UL — SIGNIFICANT CHANGE UP (ref 3.8–5.2)
RBC # FLD: 16.6 % — SIGNIFICANT CHANGE UP (ref 10.3–16.9)
SODIUM SERPL-SCNC: 135 MMOL/L — SIGNIFICANT CHANGE UP (ref 135–145)
WBC # BLD: 2.4 K/UL — LOW (ref 3.8–10.5)
WBC # FLD AUTO: 2.4 K/UL — LOW (ref 3.8–10.5)

## 2018-11-16 PROCEDURE — 36227 PLACE CATH XTRNL CAROTID: CPT | Mod: RT

## 2018-11-16 PROCEDURE — 36223 PLACE CATH CAROTID/INOM ART: CPT | Mod: 59,LT

## 2018-11-16 PROCEDURE — 36226 PLACE CATH VERTEBRAL ART: CPT | Mod: 50

## 2018-11-16 PROCEDURE — 99232 SBSQ HOSP IP/OBS MODERATE 35: CPT

## 2018-11-16 PROCEDURE — 36224 PLACE CATH CAROTD ART: CPT | Mod: RT

## 2018-11-16 PROCEDURE — 76377 3D RENDER W/INTRP POSTPROCES: CPT | Mod: 26

## 2018-11-16 PROCEDURE — 71045 X-RAY EXAM CHEST 1 VIEW: CPT | Mod: 26

## 2018-11-16 RX ORDER — SODIUM CHLORIDE 9 MG/ML
1000 INJECTION INTRAMUSCULAR; INTRAVENOUS; SUBCUTANEOUS
Qty: 0 | Refills: 0 | Status: DISCONTINUED | OUTPATIENT
Start: 2018-11-16 | End: 2018-11-17

## 2018-11-16 RX ORDER — SODIUM CHLORIDE 9 MG/ML
1000 INJECTION INTRAMUSCULAR; INTRAVENOUS; SUBCUTANEOUS
Qty: 0 | Refills: 0 | Status: DISCONTINUED | OUTPATIENT
Start: 2018-11-16 | End: 2018-11-16

## 2018-11-16 RX ORDER — MORPHINE SULFATE 50 MG/1
2 CAPSULE, EXTENDED RELEASE ORAL ONCE
Qty: 0 | Refills: 0 | Status: DISCONTINUED | OUTPATIENT
Start: 2018-11-16 | End: 2018-11-16

## 2018-11-16 RX ORDER — LANOLIN ALCOHOL/MO/W.PET/CERES
5 CREAM (GRAM) TOPICAL AT BEDTIME
Qty: 0 | Refills: 0 | Status: COMPLETED | OUTPATIENT
Start: 2018-11-16 | End: 2018-11-16

## 2018-11-16 RX ORDER — ACETAMINOPHEN 500 MG
650 TABLET ORAL ONCE
Qty: 0 | Refills: 0 | Status: COMPLETED | OUTPATIENT
Start: 2018-11-16 | End: 2018-11-16

## 2018-11-16 RX ADMIN — Medication 81 MILLIGRAM(S): at 18:08

## 2018-11-16 RX ADMIN — AMLODIPINE BESYLATE 5 MILLIGRAM(S): 2.5 TABLET ORAL at 06:14

## 2018-11-16 RX ADMIN — MORPHINE SULFATE 2 MILLIGRAM(S): 50 CAPSULE, EXTENDED RELEASE ORAL at 18:09

## 2018-11-16 RX ADMIN — VALACYCLOVIR 1000 MILLIGRAM(S): 500 TABLET, FILM COATED ORAL at 18:09

## 2018-11-16 RX ADMIN — ATORVASTATIN CALCIUM 80 MILLIGRAM(S): 80 TABLET, FILM COATED ORAL at 21:17

## 2018-11-16 RX ADMIN — ELVITEGRAVIR, COBICISTAT, EMTRICITABINE, AND TENOFOVIR ALAFENAMIDE 1 TABLET(S): 150; 150; 200; 10 TABLET ORAL at 18:08

## 2018-11-16 RX ADMIN — Medication 100 MILLIGRAM(S): at 06:14

## 2018-11-16 RX ADMIN — VALACYCLOVIR 1000 MILLIGRAM(S): 500 TABLET, FILM COATED ORAL at 06:14

## 2018-11-16 RX ADMIN — Medication 100 MILLIGRAM(S): at 21:17

## 2018-11-16 RX ADMIN — Medication 3 MILLIGRAM(S): at 00:31

## 2018-11-16 RX ADMIN — POLYETHYLENE GLYCOL 3350 17 GRAM(S): 17 POWDER, FOR SOLUTION ORAL at 18:09

## 2018-11-16 RX ADMIN — POLYETHYLENE GLYCOL 3350 17 GRAM(S): 17 POWDER, FOR SOLUTION ORAL at 06:14

## 2018-11-16 RX ADMIN — MORPHINE SULFATE 2 MILLIGRAM(S): 50 CAPSULE, EXTENDED RELEASE ORAL at 23:54
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
## 2018-11-16 NOTE — PROGRESS NOTE ADULT - ASSESSMENT
Sydnee Ennis is a 33F w PMH of HIV, HTN, CVA x2 (2013) with residual dysarthria, R sided facial droop, and R sided upper/lower extremity weakness, and asthma, who was transferred from [facility] for further stroke workup and intervention with MRI showing subacute infarct involving R paramedian parietal occipital region posteriorly and 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

## 2018-11-16 NOTE — CONSULT NOTE ADULT - ASSESSMENT
33F w PMH of HIV, HTN, CVA x2 (2013) with residual dysarthria, R sided facial droop, and R sided upper/lower extremity weakness, and asthma, who was transferred from 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    Problem/Plan - 1:  ·  Problem: Stroke.  Plan: Admitted 2 days ago to [facility] with headache that has since resolved. 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 According to pt, no new deficits. Hx of 2 strokes in 2013.  - Will go for cerebral angio today, Is medically optimized for surg.  - Echo at [facility] showed normal EF, no evidence of thrombus  - will c/w atorva 80 in place of rosuvastatin  - EKG unremarkable  - c/w ASA  - 1mg/kg of lovenox sQ.

## 2018-11-16 NOTE — BRIEF OPERATIVE NOTE - PROCEDURE
<<-----Click on this checkbox to enter Procedure Cerebral angiography  11[facility]16/2018    [facility]

## 2018-11-16 NOTE — OCCUPATIONAL THERAPY INITIAL EVALUATION ADULT - MANUAL MUSCLE TESTING RESULTS, REHAB EVAL
Left Upper Extremity WNL, Right UE shoulder elevation 2/5, shoulder flex 1/5, elbow flex/ext 3/5 (only when lying on right side), otherwise contracted 2-/5, wrist flex/ext 2-/5, right  fisted 1/5

## 2018-11-16 NOTE — OCCUPATIONAL THERAPY INITIAL EVALUATION ADULT - GENERAL OBSERVATIONS, REHAB EVAL
Right hand dominant. Patient cleared for Occupational Therapy by JONO Wong. Patient received semi-loza, in NAD, +tele, +IV heplock.

## 2018-11-16 NOTE — PROGRESS NOTE ADULT - PROBLEM SELECTOR PLAN 5
F: None  E: Replete K<4, Mg<2 PRN  N: DASH/TLC diet  GI: F: None  E: Replete K<4, Mg<2 PRN  N: DASH/TLC diet

## 2018-11-16 NOTE — PROGRESS NOTE ADULT - PROBLEM SELECTOR PLAN 1
Admitted 2 days ago to [facility] with headache that has since resolved. 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 According to pt, no new deficits. Hx of 2 strokes in 2013.  - Will go for cerebral angio today, Is medically optimized for surg.  - Echo at [facility] showed normal EF, no evidence of thrombus  - PT/OT consulted  - will c/w atorva 80 in place of rosuvastatin  - EKG unremarkable  - c/w ASA  - 1mg/kg of lovenox sQ Admitted 2 days ago to [facility] with headache that has since resolved. 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 According to pt, no new deficits. Hx of 2 strokes in 2013.  - Will go for cerebral angio today, Is medically optimized for surg.  - Echo at [facility] showed normal EF, no evidence of thrombus  - PT/OT consulted  - will c/w atorva 80 in place of rosuvastatin  - EKG unremarkable  - c/w ASA  - 1mg/kg of lovenox sQ yesterday, will consider further AC s/p angio

## 2018-11-16 NOTE — BRIEF OPERATIVE NOTE - OPERATION/FINDINGS
Femoral cerebral angiogram performed under MAC sedation via right CFA using a 4Fr sheath. Findings are consistent with right LENNY occlusion, also with aneurysmal dilatation of distal right ICA, narrowing of the right M1 segment, left MCA distal aneurysms concerning for underlying endocarditis, and bilateral stenosis of the PCAs with a dominant right vertebral artery. No clear evidence of vasculitis. Right groin was manually compressed for 15 minutes with hemostasis obtained. Patient remained neurologically and hemodynamically stable throughout the procedure.    Full report to follow, discussed with Dr. Olmstead

## 2018-11-16 NOTE — PROGRESS NOTE ADULT - SUBJECTIVE AND OBJECTIVE BOX
NEUROSURGERY POST OP NOTE:    POD# 0 S/P diagnostic cerebral angiogram    S: Patient reports some back pain but otherwise without complaints. Tolerating PO diet and HOB up.      T(C): 36.6 (11-16-18 @ 18:00), Max: 37.1 (11-15-18 @ 21:55)  HR: 68 (11-16-18 @ 10:08) (48 - 76)  BP: 122/85 (11-16-18 @ 10:08) (102/75 - 151/77)  RR: 16 (11-16-18 @ 09:08) (16 - 16)  SpO2: 85% (11-16-18 @ 10:08) (85% - 100%)      11-16-18 @ 07:01  -  11-16-18 @ 18:35  --------------------------------------------------------  IN: 200 mL / OUT: 0 mL / NET: 200 mL        ALBUTerol    90 MICROgram(s) HFA Inhaler 2 Puff(s) Inhalation every 6 hours PRN  amLODIPine   Tablet 5 milliGRAM(s) Oral daily  aspirin enteric coated 81 milliGRAM(s) Oral daily  atorvastatin 80 milliGRAM(s) Oral at bedtime  bisacodyl Suppository 10 milliGRAM(s) Rectal daily PRN  docusate sodium 100 milliGRAM(s) Oral three times a day  polyethylene glycol 3350 17 Gram(s) Oral every 12 hours  senna 2 Tablet(s) Oral at bedtime  sodium chloride 0.9%. 1000 milliLiter(s) IV Continuous <Continuous>  tenofovir alafenamide 10 mG/khffjbpudvbk330 mG/cobicistat 150 mG/emtricitabine 200 mG (GENVOYA) 1 Tablet(s) Oral daily  valACYclovir 1000 milliGRAM(s) Oral two times a day      Exam: Gen: NAD, AAOx3  HEENT: PERRL. EOMI.  Neck: FROM, nontender  Exts: Right groin C/D/I w/ dressing, pulses 2+ throughout  Neuro: Mild right facial asymmetry. Dysarthric speech. Right hemiparesis. Moving left side with good strength. Following commands.                          12.2   2.4   )-----------( 220      ( 16 Nov 2018 07:16 )             39.1       135  |  96  |  12  ----------------------------<  85  3.9   |  25  |  0.82    Ca    9.8      16 Nov 2018 07:16  Mg     1.9     11-16          Assessment: 33y Female w/ h/o CVA, HIV, HTN, Asthma with subacute right occipital CVA and distal right ICA aneurysm s/p diagnostic cerebral angiogram POD#0.      Plan:  -Diagnostic angiogram with findings of right LENYN occlusion, right distal ICA aneurysmal dilatation, right M1 narrowing, b/l PCA stenosis, and left MCA aneurysms,  -Consider r/o endocarditis giving appearance and location of left MCA aneurysms,  -Care as per primary team,  -D/w Dr. Olmstead

## 2018-11-16 NOTE — PROGRESS NOTE ADULT - SUBJECTIVE AND OBJECTIVE BOX
OVERNIGHT EVENTS:    SUBJECTIVE / INTERVAL HPI: Patient seen and examined at bedside.     VITAL SIGNS:  Vital Signs Last 24 Hrs  T(C): 36.9 (16 Nov 2018 06:33), Max: 37.1 (15 Nov 2018 21:55)  T(F): 98.4 (16 Nov 2018 06:33), Max: 98.7 (15 Nov 2018 21:55)  HR: 64 (16 Nov 2018 01:43) (64 - 102)  BP: 141/87 (16 Nov 2018 01:43) (125/83 - 151/77)  BP(mean): 104 (16 Nov 2018 01:43) (99 - 119)  RR: 16 (16 Nov 2018 01:43) (16 - 17)  SpO2: 98% (16 Nov 2018 01:43) (93% - 100%)    PHYSICAL EXAM:    General: WDWN  HEENT: NC/AT; PERRL, anicteric sclera; MMM  Neck: supple  Cardiovascular: +S1/S2, RRR  Respiratory: CTA B/L; no W/R/R  Gastrointestinal: soft, NT/ND; +BSx4  Extremities: WWP; no edema, clubbing or cyanosis  Vascular: 2+ radial, DP/PT pulses B/L  Neurological: AAOx3; no focal deficits    MEDICATIONS:  MEDICATIONS  (STANDING):  amLODIPine   Tablet 5 milliGRAM(s) Oral daily  aspirin enteric coated 81 milliGRAM(s) Oral daily  atorvastatin 80 milliGRAM(s) Oral at bedtime  docusate sodium 100 milliGRAM(s) Oral three times a day  polyethylene glycol 3350 17 Gram(s) Oral every 12 hours  senna 2 Tablet(s) Oral at bedtime  sodium chloride 0.9%. 1000 milliLiter(s) (50 mL/Hr) IV Continuous <Continuous>  tenofovir alafenamide 10 mG/bghwccctywtj071 mG/cobicistat 150 mG/emtricitabine 200 mG (GENVOYA) 1 Tablet(s) Oral daily  valACYclovir 1000 milliGRAM(s) Oral two times a day    MEDICATIONS  (PRN):  ALBUTerol    90 MICROgram(s) HFA Inhaler 2 Puff(s) Inhalation every 6 hours PRN Shortness of Breath and/or Wheezing  bisacodyl Suppository 10 milliGRAM(s) Rectal daily PRN Constipation      ALLERGIES:  Allergies    penicillin (Hives)    Intolerances        LABS:                        10.5   3.4   )-----------( 229      ( 14 Nov 2018 18:47 )             33.7     11-14    136  |  97  |  11  ----------------------------<  74  3.7   |  19<L>  |  0.81    Ca    9.8      14 Nov 2018 16:44  Mg     2.0     11-14          CAPILLARY BLOOD GLUCOSE          RADIOLOGY & ADDITIONAL TESTS: Reviewed. OVERNIGHT EVENTS: [place]    SUBJECTIVE / INTERVAL HPI: Patient seen and examined at bedside. Patient agreeable with plan for angio today. Denies f/c/cp/sob. No new numbness weakness or tingling.     VITAL SIGNS:  Vital Signs Last 24 Hrs  T(C): 36.9 (16 Nov 2018 06:33), Max: 37.1 (15 Nov 2018 21:55)  T(F): 98.4 (16 Nov 2018 06:33), Max: 98.7 (15 Nov 2018 21:55)  HR: 64 (16 Nov 2018 01:43) (64 - 102)  BP: 141/87 (16 Nov 2018 01:43) (125/83 - 151/77)  BP(mean): 104 (16 Nov 2018 01:43) (99 - 119)  RR: 16 (16 Nov 2018 01:43) (16 - 17)  SpO2: 98% (16 Nov 2018 01:43) (93% - 100%)    PHYSICAL EXAM:  Constitutional: WDWN resting comfortably in bed; NAD  Head: NC/AT  Eyes: PERRL, EOMI, anicteric sclera  ENT: no nasal discharge; uvula midline, no oropharyngeal erythema or exudates; MMM  Neck: supple; no JVD or thyromegaly  Respiratory: CTA B/L; no W/R/R, no retractions  Cardiac: +S1/S2; RRR; no M/R/G; PMI non-displaced  Gastrointestinal: abdomen soft, NT/ND; no rebound or guarding; +BSx4  Extremities: WWP, no clubbing or cyanosis; no peripheral edema  Musculoskeletal: NROM x4; no joint swelling, tenderness or erythema  Vascular: 2+ radial, femoral, DP/PT pulses B/L  Dermatologic: skin warm, dry and intact; no rashes, wounds, or scars  Lymphatic: no submandibular or cervical LAD  Neurologic:   - Mental Status:  AAOx3; speech is fluent with intact naming, repetition, and comprehension. Mild dysarthria  - Cranial Nerves II-XII:    II:  PERRLA; visual fields are full to confrontation  III, IV, VI:  EOMI, no nystagmus  V:  facial sensation is intact in the V1-V3 distribution bilaterally.  VII:  Very slight right sided facial droop evident on smile  VIII:  hearing is intact to finger rub  IX, X:  uvula is midline and soft palate rises symmetrically  XI:  head turning and shoulder shrug are intact bilaterally  XII:  tongue protrudes in the midline  - Motor:  strength is 5/5 on L side. strength 3/5 proximally in RLE 1/5 at foot. strength 3-4/5 on RUE, 0/5 in R hand.  normal muscle bulk and tone throughout; no pronator drift  - Reflexes:  plantar reflexes downgoing bilaterally  - Sensory:  intact to light touch, pin prick, vibration, and joint-position sense throughout  - Coordination:  finger-nose-finger and heel-knee-shin intact without dysmetria on L side; rapid alternating hand movements intact on L.  - Gait:  Gait assessment deferred.    MEDICATIONS:  MEDICATIONS  (STANDING):  amLODIPine   Tablet 5 milliGRAM(s) Oral daily  aspirin enteric coated 81 milliGRAM(s) Oral daily  atorvastatin 80 milliGRAM(s) Oral at bedtime  docusate sodium 100 milliGRAM(s) Oral three times a day  polyethylene glycol 3350 17 Gram(s) Oral every 12 hours  senna 2 Tablet(s) Oral at bedtime  sodium chloride 0.9%. 1000 milliLiter(s) (50 mL/Hr) IV Continuous <Continuous>  tenofovir alafenamide 10 mG/oftjfddkwvmf748 mG/cobicistat 150 mG/emtricitabine 200 mG (GENVOYA) 1 Tablet(s) Oral daily  valACYclovir 1000 milliGRAM(s) Oral two times a day    MEDICATIONS  (PRN):  ALBUTerol    90 MICROgram(s) HFA Inhaler 2 Puff(s) Inhalation every 6 hours PRN Shortness of Breath and/or Wheezing  bisacodyl Suppository 10 milliGRAM(s) Rectal daily PRN Constipation      ALLERGIES:  Allergies    penicillin (Hives)    Intolerances        LABS:                        10.5   3.4   )-----------( 229      ( 14 Nov 2018 18:47 )             33.7     11-14    136  |  97  |  11  ----------------------------<  74  3.7   |  19<L>  |  0.81    Ca    9.8      14 Nov 2018 16:44  Mg     2.0     11-14          CAPILLARY BLOOD GLUCOSE          RADIOLOGY & ADDITIONAL TESTS: Reviewed.

## 2018-11-16 NOTE — OCCUPATIONAL THERAPY INITIAL EVALUATION ADULT - PLANNED THERAPY INTERVENTIONS, OT EVAL
IADL retraining/bed mobility training/strengthening/balance training/fine motor coordination training/joint mobilization/neuromuscular re-education/ROM/ADL retraining/motor coordination training/transfer training

## 2018-11-16 NOTE — OCCUPATIONAL THERAPY INITIAL EVALUATION ADULT - STANDING BALANCE: DYNAMIC, REHAB EVAL
poor plus/Patient reports buckling of right knee at baseline. Not willing to ambulate without cane or RW

## 2018-11-16 NOTE — OCCUPATIONAL THERAPY INITIAL EVALUATION ADULT - PERTINENT HX OF CURRENT PROBLEM, REHAB EVAL
Sydnee Ennis is a 33F w PMH of HIV, HTN, CVA x2 (2013) with residual dysarthria, R sided facial droop, and R sided upper/lower extremity weakness, and asthma, who was transferred from [facility] for further stroke workup and intervention. Patient originally presented to [facility] for a severe frontal headache. CT (+) acute/subacute infarct right parietal lobe, Aneurysm of distal right ICA.

## 2018-11-16 NOTE — OCCUPATIONAL THERAPY INITIAL EVALUATION ADULT - RANGE OF MOTION EXAMINATION, UPPER EXTREMITY
Left UE Active ROM was WNL (within normal limits)/Right Upper Extremity with shoulder PROM limited to 90 degrees 2/2 pain, elbow flex/ext AROM WFL only when lying on right side in bed, right wrist flex/ext AROM limited to ~5 degrees, right  trace AROM

## 2018-11-16 NOTE — PROGRESS NOTE ADULT - PROBLEM SELECTOR PLAN 6
DVT PPx: SCDs + Lovenox  GI PPx: Not indicated    Dispo: [facility] for stroke workup DVT PPx: SCDs + Lovenox (AC as above)  GI PPx: Not indicated    Dispo: [facility] for stroke workup

## 2018-11-17 LAB
ANION GAP SERPL CALC-SCNC: 10 MMOL/L — SIGNIFICANT CHANGE UP (ref 5–17)
BUN SERPL-MCNC: 11 MG/DL — SIGNIFICANT CHANGE UP (ref 7–23)
CALCIUM SERPL-MCNC: 9.7 MG/DL — SIGNIFICANT CHANGE UP (ref 8.4–10.5)
CHLORIDE SERPL-SCNC: 100 MMOL/L — SIGNIFICANT CHANGE UP (ref 96–108)
CHOLEST SERPL-MCNC: 152 MG/DL — SIGNIFICANT CHANGE UP (ref 10–199)
CO2 SERPL-SCNC: 23 MMOL/L — SIGNIFICANT CHANGE UP (ref 22–31)
CREAT SERPL-MCNC: 0.76 MG/DL — SIGNIFICANT CHANGE UP (ref 0.5–1.3)
GLUCOSE SERPL-MCNC: 81 MG/DL — SIGNIFICANT CHANGE UP (ref 70–99)
HBA1C BLD-MCNC: 4.9 % — SIGNIFICANT CHANGE UP (ref 4–5.6)
HCT VFR BLD CALC: 38.5 % — SIGNIFICANT CHANGE UP (ref 34.5–45)
HDLC SERPL-MCNC: 57 MG/DL — SIGNIFICANT CHANGE UP
HGB BLD-MCNC: 12 G/DL — SIGNIFICANT CHANGE UP (ref 11.5–15.5)
LIPID PNL WITH DIRECT LDL SERPL: 72 MG/DL — SIGNIFICANT CHANGE UP
MAGNESIUM SERPL-MCNC: 2 MG/DL — SIGNIFICANT CHANGE UP (ref 1.6–2.6)
MCHC RBC-ENTMCNC: 24.7 PG — LOW (ref 27–34)
MCHC RBC-ENTMCNC: 31.2 G/DL — LOW (ref 32–36)
MCV RBC AUTO: 79.4 FL — LOW (ref 80–100)
PLATELET # BLD AUTO: 233 K/UL — SIGNIFICANT CHANGE UP (ref 150–400)
POTASSIUM SERPL-MCNC: 4.1 MMOL/L — SIGNIFICANT CHANGE UP (ref 3.5–5.3)
POTASSIUM SERPL-SCNC: 4.1 MMOL/L — SIGNIFICANT CHANGE UP (ref 3.5–5.3)
RBC # BLD: 4.85 M/UL — SIGNIFICANT CHANGE UP (ref 3.8–5.2)
RBC # FLD: 16.8 % — SIGNIFICANT CHANGE UP (ref 10.3–16.9)
SODIUM SERPL-SCNC: 133 MMOL/L — LOW (ref 135–145)
TOTAL CHOLESTEROL/HDL RATIO MEASUREMENT: 2.7 RATIO — LOW (ref 3.3–7.1)
TRIGL SERPL-MCNC: 113 MG/DL — SIGNIFICANT CHANGE UP (ref 10–149)
TSH SERPL-MCNC: 0.85 UIU/ML — SIGNIFICANT CHANGE UP (ref 0.35–4.94)
WBC # BLD: 2.1 K/UL — LOW (ref 3.8–10.5)
WBC # FLD AUTO: 2.1 K/UL — LOW (ref 3.8–10.5)

## 2018-11-17 PROCEDURE — 99233 SBSQ HOSP IP/OBS HIGH 50: CPT

## 2018-11-17 PROCEDURE — 99223 1ST HOSP IP/OBS HIGH 75: CPT

## 2018-11-17 RX ORDER — MORPHINE SULFATE 50 MG/1
2 CAPSULE, EXTENDED RELEASE ORAL ONCE
Qty: 0 | Refills: 0 | Status: DISCONTINUED | OUTPATIENT
Start: 2018-11-17 | End: 2018-11-17

## 2018-11-17 RX ORDER — ONDANSETRON 8 MG/1
4 TABLET, FILM COATED ORAL ONCE
Qty: 0 | Refills: 0 | Status: COMPLETED | OUTPATIENT
Start: 2018-11-17 | End: 2018-11-17

## 2018-11-17 RX ORDER — CLOPIDOGREL BISULFATE 75 MG/1
75 TABLET, FILM COATED ORAL DAILY
Qty: 0 | Refills: 0 | Status: DISCONTINUED | OUTPATIENT
Start: 2018-11-17 | End: 2018-11-20

## 2018-11-17 RX ORDER — POTASSIUM CHLORIDE 20 MEQ
20 PACKET (EA) ORAL ONCE
Qty: 0 | Refills: 0 | Status: COMPLETED | OUTPATIENT
Start: 2018-11-17 | End: 2018-11-17

## 2018-11-17 RX ORDER — HEPARIN SODIUM 5000 [USP'U]/ML
5000 INJECTION INTRAVENOUS; SUBCUTANEOUS EVERY 8 HOURS
Qty: 0 | Refills: 0 | Status: DISCONTINUED | OUTPATIENT
Start: 2018-11-17 | End: 2018-11-20

## 2018-11-17 RX ADMIN — MORPHINE SULFATE 2 MILLIGRAM(S): 50 CAPSULE, EXTENDED RELEASE ORAL at 21:35

## 2018-11-17 RX ADMIN — ATORVASTATIN CALCIUM 80 MILLIGRAM(S): 80 TABLET, FILM COATED ORAL at 21:35

## 2018-11-17 RX ADMIN — CLOPIDOGREL BISULFATE 75 MILLIGRAM(S): 75 TABLET, FILM COATED ORAL at 14:30

## 2018-11-17 RX ADMIN — MORPHINE SULFATE 2 MILLIGRAM(S): 50 CAPSULE, EXTENDED RELEASE ORAL at 22:47

## 2018-11-17 RX ADMIN — ONDANSETRON 4 MILLIGRAM(S): 8 TABLET, FILM COATED ORAL at 02:08

## 2018-11-17 RX ADMIN — ELVITEGRAVIR, COBICISTAT, EMTRICITABINE, AND TENOFOVIR ALAFENAMIDE 1 TABLET(S): 150; 150; 200; 10 TABLET ORAL at 13:29

## 2018-11-17 RX ADMIN — Medication 100 MILLIGRAM(S): at 13:30

## 2018-11-17 NOTE — PROGRESS NOTE ADULT - SUBJECTIVE AND OBJECTIVE BOX
OVERNIGHT EVENTS:CHESTER     SUBJECTIVE / INTERVAL HPI: Patient seen and examined at bedside. No acute complaints     VITAL SIGNS:  Vital Signs Last 24 Hrs  T(C): 36.8 (17 Nov 2018 09:51), Max: 36.8 (17 Nov 2018 09:51)  T(F): 98.2 (17 Nov 2018 09:51), Max: 98.2 (17 Nov 2018 09:51)  HR: 68 (17 Nov 2018 08:45) (62 - 72)  BP: 114/73 (17 Nov 2018 08:45) (108/64 - 132/84)  BP(mean): 87 (17 Nov 2018 08:45) (87 - 104)  RR: 18 (17 Nov 2018 08:45) (16 - 18)  SpO2: 96% (17 Nov 2018 08:45) (95% - 97%)    PHYSICAL EXAM:  PT REFUSING PHYSICAL EXAM     MEDICATIONS:  MEDICATIONS  (STANDING):  amLODIPine   Tablet 5 milliGRAM(s) Oral daily  aspirin enteric coated 81 milliGRAM(s) Oral daily  atorvastatin 80 milliGRAM(s) Oral at bedtime  docusate sodium 100 milliGRAM(s) Oral three times a day  polyethylene glycol 3350 17 Gram(s) Oral every 12 hours  senna 2 Tablet(s) Oral at bedtime  sodium chloride 0.9%. 1000 milliLiter(s) (75 mL/Hr) IV Continuous <Continuous>  tenofovir alafenamide 10 mG/nfvlrixnazka019 mG/cobicistat 150 mG/emtricitabine 200 mG (GENVOYA) 1 Tablet(s) Oral daily  valACYclovir 1000 milliGRAM(s) Oral two times a day    MEDICATIONS  (PRN):  ALBUTerol    90 MICROgram(s) HFA Inhaler 2 Puff(s) Inhalation every 6 hours PRN Shortness of Breath and/or Wheezing  bisacodyl Suppository 10 milliGRAM(s) Rectal daily PRN Constipation      ALLERGIES:  Allergies    penicillin (Hives)    Intolerances        LABS:                        12.2   2.4   )-----------( 220      ( 16 Nov 2018 07:16 )             39.1     11-16    135  |  96  |  12  ----------------------------<  85  3.9   |  25  |  0.82    Ca    9.8      16 Nov 2018 07:16  Mg     1.9     11-16      PT/INR - ( 16 Nov 2018 07:16 )   PT: 12.0 sec;   INR: 1.06          PTT - ( 16 Nov 2018 07:16 )  PTT:35.2 sec    CAPILLARY BLOOD GLUCOSE          RADIOLOGY & ADDITIONAL TESTS: Reviewed.    ASSESSMENT:    PLAN:

## 2018-11-17 NOTE — PROGRESS NOTE ADULT - PROBLEM SELECTOR PLAN 6
DVT PPx: SCDs , Lovenox   GI PPx: Not indicated    Dispo: [facility] for stroke workup DVT PPx: SCDs , hep s/q   GI PPx: Not indicated    Dispo: [facility] for stroke workup

## 2018-11-17 NOTE — PROGRESS NOTE ADULT - SUBJECTIVE AND OBJECTIVE BOX
=====================   STROKE ATTENDING NOTE  =====================     MARQUEZ VELAZQUEZ   MRN-[id number]  Summary:  33y/F with HIV Hypertension CVA x2 residual dysarthria, R facial droop R UE LE weakness, asthma.  She presented with severe frontal headache, numbness L hand, imbalance.  Brought to [facility] where CT showed acute/subacute infarction R parietal.  CTA multifocal stenosis.  MRI showed subacute infarct R paramedian parietal occipital region.  ALarge aneurysm distal R ICA.  LP done, CSF unremarkable.  Transferred to [facility].  Overnight Events: No significant events overnight.  REVIEW OF SYSTEMS:  No headaches, no nausea or vomiting; 14 -point review of systems otherwise unremarkable.    PMH:  Stroke HIV (human immunodeficiency virus infection) Essential hypertension Mild intermittent asthma without complication  Allergies:  penicillin (Hives)  Home meds: amlodipine 5mg PO daily ASA 81mg daily bactrim daily colace daily Genvoya daily rosuvastatin 20mg HS valtrex 500mg PO daily     PHYSICAL EXAMINATION  T(C): 36.8 ( @ 09:51), Max: 36.8 ( @ 09:51) HR: 68 ( @ 08:45) (62 - 72) BP: 114/73 ( @ 08:45) (108/64 - 132/84) RR: 18 ( @ 08:45) (16 - 18) SpO2: 96% ( @ 08:45) (95% - 97%)  NEUROLOGIC EXAMINATION:  Patient is awake, alert, fully oriented, pupils 2-3mm equal and briskly reactive to light, EOMs intact, muscle strength 5/5 on all 4 extremities  GENERAL:  not intubated, not in cardiorespiratory distress  EENT: anicteric  CARDIOVASC:  (+) S1 S2, normal rate and regular rhythm  PULMONARY:  clear to auscultation bilaterally  ABDOMEN:  soft, nontender, with normoactive bowel sounds  EXTREMITIES:  no edema  SKIN:  no rash  MISC:      LABS:    pending AM labs     @ 07:01  -   @ 07:00  IN: 725 mL / OUT: 0 mL / NET: 725 mL    Bacteriology:  CSF studies:  EEG:  Neuroimaging:  s/p angio 11/16  R LENNY occlusion, distal R ICA aneurysm, R M1 narrowing, L MCA distal aneurysms ?endocarditis, bilateral PCA stenosis, dominant R VA; no vasculitis  Other imagin/16 CXR: no change    MEDICATIONS: amlodipine 5mg PO daily asa 81mg daily atorvastatin 80mg HS docusate 100 TID miralax q12h senna 2mg HS tenofovir / elvitegravir / cobicistat / emtricitabin [id number] BID bisacodyl    IV FLUIDS: NS@75cc/hr  DRIPS:  DIET: DASH  Lines:    CODE STATUS:  full code                       GOALS OF CARE:  aggressive                      DISPOSITION:  [facility] =====================   STROKE ATTENDING NOTE  =====================     MARQUEZ VELAZQUEZ   MRN-[id number]  Summary:  33y/F with HIV Hypertension CVA x2 residual dysarthria, R facial droop R UE LE weakness, asthma.  She presented with severe frontal headache, numbness L hand, imbalance.  Brought to [place] where CT showed acute/subacute infarction R parietal.  CTA multifocal stenosis.  MRI showed subacute infarct R paramedian parietal occipital region.  ALarge aneurysm distal R ICA.  LP done, CSF unremarkable.  Transferred to [facility].  Overnight Events: No significant events overnight.  REVIEW OF SYSTEMS:  No headaches, no nausea or vomiting; 14 -point review of systems otherwise unremarkable.    PMH:  Stroke HIV (human immunodeficiency virus infection) Essential hypertension Mild intermittent asthma without complication  Allergies:  penicillin (Hives)  Home meds: amlodipine 5mg PO daily ASA 81mg daily bactrim daily colace daily Genvoya daily rosuvastatin 20mg HS valtrex 500mg PO daily     PHYSICAL EXAMINATION  T(C): 36.8 ( @ 09:51), Max: 36.8 ( @ 09:51) HR: 68 ( @ 08:45) (62 - 72) BP: 114/73 ( @ 08:45) (108/64 - 132/84) RR: 18 ( @ 08:45) (16 - 18) SpO2: 96% ( @ 08:45) (95% - 97%)  NEUROLOGIC EXAMINATION:  Patient is awake, alert, orientedx3, pupils 3mm reactive, EOMs intact, moves L UE/LE spontaneously, 2-3/5 R UE/LE  GENERAL:  not intubated, not in cardiorespiratory distress  EENT: anicteric  CARDIOVASC:  (+) S1 S2, normal rate and regular rhythm  PULMONARY:  clear to auscultation bilaterally  ABDOMEN:  soft, nontender, with normoactive bowel sounds  EXTREMITIES:  no edema  SKIN:  no rash  MISC:      LABS:    pending AM labs     @ 07:01  -   @ 07:00  IN: 725 mL / OUT: 0 mL / NET: 725 mL    Bacteriology:  CSF studies:  EEG:  Neuroimaging:  s/p angio 11/16  R LENNY occlusion, distal R ICA aneurysm, R M1 narrowing, L MCA distal aneurysms ?endocarditis, bilateral PCA stenosis, dominant R VA; no vasculitis  Other imagin/16 CXR: no change    MEDICATIONS: amlodipine 5mg PO daily asa 81mg daily atorvastatin 80mg HS docusate 100 TID miralax q12h senna 2mg HS tenofovir / elvitegravir / cobicistat / emtricitabin [id number] BID bisacodyl    IV FLUIDS: NS@75cc/hr  DRIPS:  DIET: DASH  Lines:    CODE STATUS:  full code                       GOALS OF CARE:  aggressive                      DISPOSITION:  [facility]

## 2018-11-17 NOTE — CONSULT NOTE ADULT - ASSESSMENT
34 yo female with HIV on Genvoya, hx CVA X 2, found to have R ICA aneurysm. Absence of fever and normal inflammatory marker (CRP) go against mycotic aneurysm.  - bcx  - TTE  - ESR  - RPR  - QFT  - HIV VL and CD4  - recommend observation off antibiotics at this time

## 2018-11-17 NOTE — PROGRESS NOTE ADULT - ASSESSMENT
33y/F with  1.  subacute infarction R parieto-occipital region; R LENNY occlusion, distal R ICA aneurysm, R M1 stenosis, L MCA distal aneurysms, bilateral PCA stenosis  2.  HIV  3.  Hypertension  4.  h/o asthma  5.  leukopenia    PLAN:   NEURO: neurochecks q4h  s/p angio:  R LENNY occlusion, distal R ICA aneurysm, R M1 narrowing, L MCA distal aneurysms ?endocarditis, bilateral PCA stenosis, dominant R VA; no vasculitis  CVA subacute:  document NIHSS, continue atorvastatin; d/c ASA, start plavix 75mg daily; complete stroke core measures: A1C.lipidprofile, TSH  aneurysms  REHAB:  physical therapy evaluation and management    EARLY MOB:  HOB up    PULM:  Room air, incentive spirometry  CARDIO:  SBP goal 100-150mm Hg, echo r/o IE, include bubble study  ENDO:  Blood sugar goals 140-180 mg/dL, continue insulin sliding scale; echo r/o vegetations  GI:  docusate senna  DIET: continue DASh diet  RENAL:  d/c IVF if eating well  HEM/ONC: leukopenia - valacyclovir related?  heme consult  VTE Prophylaxis: SCDs, start SQH  ID: afebrile, leukopenia; blood cultures x2; continue HAART, valacyclovir  Social: will update family    ATTENDING ATTESTATION:  I was physically present for the key portions of the evaluation and management (E/M) service provided.  I agree with the above history, physical and plan which I have reviewed and edited where appropriate.     Patient not at high risk for neurologic deterioration / death.  Time spent on this noncritically ill patient: 45 minutes spent on total encounter, more than 50% of the visit was spent counseling and/or coordinating care by the attending physician.    Plan discussed with RN, house staff.
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
## 2018-11-17 NOTE — PROGRESS NOTE ADULT - ASSESSMENT
Sydnee Ennis is a 33F w PMH of HIV, HTN, CVA x2 (2013) with residual dysarthria, R sided facial droop, and R sided upper/lower extremity weakness, and asthma, who was transferred from [facility] for further stroke workup and intervention with MRI showing subacute infarct involving R paramedian parietal occipital region posteriorly and 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

## 2018-11-17 NOTE — PROGRESS NOTE ADULT - PROBLEM SELECTOR PLAN 1
Admitted 2 days ago to [facility] with headache that has since resolved. 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 According to pt, no new deficits. Hx of 2 strokes in 2013.  - Echo at [facility] showed normal EF, no evidence of thrombus  - will c/w atorva 80  - c/w ASA  - As per neurosurgery note : Diagnostic angiogram with findings of right LENNY occlusion, right distal ICA aneurysmal dilatation, right M1 narrowing, b/l PCA stenosis, and left MCA aneurysms,  -As per Neurosurgery -consider r/o endocarditis giving appearance and location of left MCA aneurysms Admitted 2 days ago to [place] with headache that has since resolved. 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 According to pt, no new deficits. Hx of 2 strokes in 2013.  - Echo at [place] showed normal EF, no evidence of thrombus  - will c/w atorva 80  - will change aspirin to plavix as pt developed stroke on aspirin.   - As per neurosurgery note : Diagnostic angiogram with findings of right LENNY occlusion, right distal ICA aneurysmal dilatation, right M1 narrowing, b/l PCA stenosis, and left MCA aneurysms,  -As per Neurosurgery -consider r/o endocarditis giving appearance and location of left MCA aneurysms  -echo  - 2 set of blood cx  -call ID CONSULT Admitted 2 days ago to [place] with headache that has since resolved. Initial NIHH score 7. 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 According to pt, no new deficits. Hx of 2 strokes in 2013.  - Echo at [place] showed normal EF, no evidence of thrombus  - will c/w atorva 80  - will change aspirin to plavix as pt developed stroke on aspirin.   - As per neurosurgery note : Diagnostic angiogram with findings of right LENNY occlusion, right distal ICA aneurysmal dilatation, right M1 narrowing, b/l PCA stenosis, and left MCA aneurysms,  -As per Neurosurgery -consider r/o endocarditis giving appearance and location of left MCA aneurysms  -echo  - 2 set of blood cx  -call ID CONSULT

## 2018-11-18 PROCEDURE — 99233 SBSQ HOSP IP/OBS HIGH 50: CPT

## 2018-11-18 RX ORDER — KETOROLAC TROMETHAMINE 30 MG/ML
15 SYRINGE (ML) INJECTION ONCE
Qty: 0 | Refills: 0 | Status: DISCONTINUED | OUTPATIENT
Start: 2018-11-18 | End: 2018-11-18

## 2018-11-18 RX ORDER — ONDANSETRON 8 MG/1
4 TABLET, FILM COATED ORAL ONCE
Qty: 0 | Refills: 0 | Status: COMPLETED | OUTPATIENT
Start: 2018-11-18 | End: 2018-11-18

## 2018-11-18 RX ORDER — IBUPROFEN 200 MG
400 TABLET ORAL ONCE
Qty: 0 | Refills: 0 | Status: COMPLETED | OUTPATIENT
Start: 2018-11-18 | End: 2018-11-18

## 2018-11-18 RX ADMIN — Medication 15 MILLIGRAM(S): at 22:20

## 2018-11-18 RX ADMIN — VALACYCLOVIR 1000 MILLIGRAM(S): 500 TABLET, FILM COATED ORAL at 12:56

## 2018-11-18 RX ADMIN — ONDANSETRON 4 MILLIGRAM(S): 8 TABLET, FILM COATED ORAL at 15:19

## 2018-11-18 RX ADMIN — ELVITEGRAVIR, COBICISTAT, EMTRICITABINE, AND TENOFOVIR ALAFENAMIDE 1 TABLET(S): 150; 150; 200; 10 TABLET ORAL at 12:56

## 2018-11-18 RX ADMIN — Medication 15 MILLIGRAM(S): at 22:45

## 2018-11-18 RX ADMIN — CLOPIDOGREL BISULFATE 75 MILLIGRAM(S): 75 TABLET, FILM COATED ORAL at 12:57

## 2018-11-18 RX ADMIN — ATORVASTATIN CALCIUM 80 MILLIGRAM(S): 80 TABLET, FILM COATED ORAL at 22:26

## 2018-11-18 RX ADMIN — AMLODIPINE BESYLATE 5 MILLIGRAM(S): 2.5 TABLET ORAL at 12:57

## 2018-11-18 NOTE — PROGRESS NOTE ADULT - PROBLEM SELECTOR PLAN 1
Admitted 2 days ago to [place] with headache that has since resolved. Initial NIHH score 7. 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 According to pt, no new deficits. Hx of 2 strokes in 2013.  - Echo at [place] showed normal EF, no evidence of thrombus  - will c/w atorva 80  - will change aspirin to plavix as pt developed stroke on aspirin.   - As per neurosurgery note : Diagnostic angiogram with findings of right LENNY occlusion, right distal ICA aneurysmal dilatation, right M1 narrowing, b/l PCA stenosis, and left MCA aneurysms,  -As per Neurosurgery -consider r/o endocarditis giving appearance and location of left MCA aneurysms  -echo  - 2 set of blood cx  -call ID CONSULT Admitted 2 days ago to [place] with headache that has since resolved. Initial NIHH score 7. 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 According to pt, no new deficits. Hx of 2 strokes in 2013.  - Echo at [place] showed normal EF, no evidence of thrombus  - will c/w atorva 80  - will change aspirin to plavix as pt developed stroke on aspirin.   - As per neurosurgery note : Diagnostic angiogram with findings of right LENNY occlusion, right distal ICA aneurysmal dilatation, right M1 narrowing, b/l PCA stenosis, and left MCA aneurysms,  -As per Neurosurgery -consider r/o endocarditis giving appearance and location of left MCA aneurysms  -echo  - 1 set of blood cx, patient refused further blood draws  - ID consulted, appreciate recs, no abx for now

## 2018-11-18 NOTE — PROGRESS NOTE ADULT - SUBJECTIVE AND OBJECTIVE BOX
OVERNIGHT EVENTS:    SUBJECTIVE / INTERVAL HPI: Patient seen and examined at bedside.     VITAL SIGNS:  Vital Signs Last 24 Hrs  T(C): 36.7 (18 Nov 2018 01:00), Max: 36.9 (17 Nov 2018 22:24)  T(F): 98.1 (18 Nov 2018 01:00), Max: 98.4 (17 Nov 2018 22:24)  HR: 68 (18 Nov 2018 04:39) (60 - 114)  BP: 111/75 (18 Nov 2018 04:39) (108/64 - 132/94)  BP(mean): 89 (18 Nov 2018 04:39) (87 - 108)  RR: 16 (18 Nov 2018 04:39) (16 - 19)  SpO2: 100% (18 Nov 2018 04:39) (83% - 100%)    PHYSICAL EXAM:    General: WDWN  HEENT: NC/AT; PERRL, anicteric sclera; MMM  Neck: supple  Cardiovascular: +S1/S2, RRR  Respiratory: CTA B/L; no W/R/R  Gastrointestinal: soft, NT/ND; +BSx4  Extremities: WWP; no edema, clubbing or cyanosis  Vascular: 2+ radial, DP/PT pulses B/L  Neurological: AAOx3; no focal deficits    MEDICATIONS:  MEDICATIONS  (STANDING):  amLODIPine   Tablet 5 milliGRAM(s) Oral daily  atorvastatin 80 milliGRAM(s) Oral at bedtime  clopidogrel Tablet 75 milliGRAM(s) Oral daily  docusate sodium 100 milliGRAM(s) Oral three times a day  heparin  Injectable 5000 Unit(s) SubCutaneous every 8 hours  polyethylene glycol 3350 17 Gram(s) Oral every 12 hours  senna 2 Tablet(s) Oral at bedtime  tenofovir alafenamide 10 mG/qsnzawizqwdl328 mG/cobicistat 150 mG/emtricitabine 200 mG (GENVOYA) 1 Tablet(s) Oral daily  valACYclovir 1000 milliGRAM(s) Oral two times a day    MEDICATIONS  (PRN):  ALBUTerol    90 MICROgram(s) HFA Inhaler 2 Puff(s) Inhalation every 6 hours PRN Shortness of Breath and/or Wheezing  bisacodyl Suppository 10 milliGRAM(s) Rectal daily PRN Constipation      ALLERGIES:  Allergies    penicillin (Hives)    Intolerances        LABS:                        12.0   2.1   )-----------( 233      ( 17 Nov 2018 15:05 )             38.5     11-17    133<L>  |  100  |  11  ----------------------------<  81  4.1   |  23  |  0.76    Ca    9.7      17 Nov 2018 15:05  Mg     2.0     11-17      PT/INR - ( 16 Nov 2018 07:16 )   PT: 12.0 sec;   INR: 1.06          PTT - ( 16 Nov 2018 07:16 )  PTT:35.2 sec    CAPILLARY BLOOD GLUCOSE          RADIOLOGY & ADDITIONAL TESTS: Reviewed. OVERNIGHT EVENTS: Blood cultures drawn for endocarditis workup. Given morphine for headache, then vomited.    SUBJECTIVE / INTERVAL HPI: Patient refused to be seen this AM.    VITAL SIGNS:  Vital Signs Last 24 Hrs  T(C): 36.7 (18 Nov 2018 01:00), Max: 36.9 (17 Nov 2018 22:24)  T(F): 98.1 (18 Nov 2018 01:00), Max: 98.4 (17 Nov 2018 22:24)  HR: 68 (18 Nov 2018 04:39) (60 - 114)  BP: 111/75 (18 Nov 2018 04:39) (108/64 - 132/94)  BP(mean): 89 (18 Nov 2018 04:39) (87 - 108)  RR: 16 (18 Nov 2018 04:39) (16 - 19)  SpO2: 100% (18 Nov 2018 04:39) (83% - 100%)    PHYSICAL EXAM:    General: In NAD. patient refused to be seen this AM.    MEDICATIONS:  MEDICATIONS  (STANDING):  amLODIPine   Tablet 5 milliGRAM(s) Oral daily  atorvastatin 80 milliGRAM(s) Oral at bedtime  clopidogrel Tablet 75 milliGRAM(s) Oral daily  docusate sodium 100 milliGRAM(s) Oral three times a day  heparin  Injectable 5000 Unit(s) SubCutaneous every 8 hours  polyethylene glycol 3350 17 Gram(s) Oral every 12 hours  senna 2 Tablet(s) Oral at bedtime  tenofovir alafenamide 10 mG/ctqwmlogxgny217 mG/cobicistat 150 mG/emtricitabine 200 mG (GENVOYA) 1 Tablet(s) Oral daily  valACYclovir 1000 milliGRAM(s) Oral two times a day    MEDICATIONS  (PRN):  ALBUTerol    90 MICROgram(s) HFA Inhaler 2 Puff(s) Inhalation every 6 hours PRN Shortness of Breath and/or Wheezing  bisacodyl Suppository 10 milliGRAM(s) Rectal daily PRN Constipation      ALLERGIES:  Allergies    penicillin (Hives)    Intolerances        LABS:                        12.0   2.1   )-----------( 233      ( 17 Nov 2018 15:05 )             38.5     11-17    133<L>  |  100  |  11  ----------------------------<  81  4.1   |  23  |  0.76    Ca    9.7      17 Nov 2018 15:05  Mg     2.0     11-17      PT/INR - ( 16 Nov 2018 07:16 )   PT: 12.0 sec;   INR: 1.06          PTT - ( 16 Nov 2018 07:16 )  PTT:35.2 sec    CAPILLARY BLOOD GLUCOSE          RADIOLOGY & ADDITIONAL TESTS: Reviewed.

## 2018-11-18 NOTE — PROGRESS NOTE ADULT - ASSESSMENT
33y/F with  1.  subacute infarction R parieto-occipital region; R LENNY occlusion, distal R ICA aneurysm, R M1 stenosis, L MCA distal aneurysms, bilateral PCA stenosis; r/o infective endocarditis  2.  HIV  3.  Hypertension  4.  h/o asthma  5.  leukopenia    PLAN:   NEURO: neurochecks q4h  s/p angio:  R LENNY occlusion, distal R ICA aneurysm, R M1 narrowing, L MCA distal aneurysms ?endocarditis, bilateral PCA stenosis, dominant R VA; no vasculitis  CVA subacute:  document NIHSS, continue atorvastatin; ASA switched to plavix 75mg daily   aneurysms: ?distal, ?mycotic?   REHAB:  physical therapy evaluation and management    EARLY MOB:  HOB up    PULM:  Room air, incentive spirometry  CARDIO:  SBP goal 100-150mm Hg, f/u echo r/o IE, include bubble study  ENDO:  Blood sugar goals 140-180 mg/dL, continue insulin sliding scale  GI:  docusate senna  DIET: continue DASh diet  RENAL:  IVL  HEM/ONC: leukopenia - valacyclovir related?; f/u AM CBC  VTE Prophylaxis: SCDs, SQH  ID: afebrile, leukopenia; f/u blood cultures; continue HAART, valacyclovir; ID notes appreciated; f/u ESR CRP RPR QFT HIV VL CD4  Social: will update family    ATTENDING ATTESTATION:  I was physically present for the key portions of the evaluation and management (E/M) service provided.  I agree with the above history, physical and plan which I have reviewed and edited where appropriate.     Patient not at high risk for neurologic deterioration / death.  Time spent on this noncritically ill patient: 45 minutes spent on total encounter, more than 50% of the visit was spent counseling and/or coordinating care by the attending physician.    Plan discussed with RN, house staff.

## 2018-11-18 NOTE — PROGRESS NOTE ADULT - SUBJECTIVE AND OBJECTIVE BOX
=====================   STROKE ATTENDING NOTE  =====================     MARQUEZ VELAZQUEZ   MRN-[id number]  Summary:  33y/F with HIV Hypertension CVA x2 residual dysarthria, R facial droop R UE LE weakness, asthma.  She presented with severe frontal headache, numbness L hand, imbalance.  Brought to [place] where CT showed acute/subacute infarction R parietal.  CTA multifocal stenosis.  MRI showed subacute infarct R paramedian parietal occipital region.  Large aneurysm distal R ICA.  LP done, CSF unremarkable.  Transferred to [facility].  Overnight Events: No significant events overnight.  REVIEW OF SYSTEMS:  No headaches, no nausea or vomiting; 14 -point review of systems otherwise unremarkable.    PMH:  Stroke HIV (human immunodeficiency virus infection) Essential hypertension Mild intermittent asthma without complication  Allergies:  penicillin (Hives)  Home meds: amlodipine 5mg PO daily ASA 81mg daily bactrim daily colace daily Genvoya daily rosuvastatin 20mg HS valtrex 500mg PO daily     PHYSICAL EXAMINATION  T(C): 36.7 ( @ 01:00), Max: 36.9 ( @ 22:24) HR: 62 ( @ 09:25) (60 - 114) BP: 115/81 ( @ 09:25) (111/75 - 132/94) RR: 16 ( @ 09:25) (16 - 19) SpO2: 97% ( @ 09:25) (83% - 100%)   NEUROLOGIC EXAMINATION:  Patient is awake, alert, orientedx3, pupils 3mm reactive, EOMs intact, moves L UE/LE spontaneously, 2-3/5 R UE/LE  GENERAL:  not intubated, not in cardiorespiratory distress  EENT: anicteric  CARDIOVASC:  (+) S1 S2, normal rate and regular rhythm  PULMONARY:  clear to auscultation bilaterally  ABDOMEN:  soft, nontender, with normoactive bowel sounds  EXTREMITIES:  no edema  SKIN:  no rash    LABS:  CAPILLARY BLOOD GLUCOSE    pending AM labs    HbA1C = 4.9 ()  LDL = 72 ()   HDL = 57 ()  TG = 113 ()  TSH = 0.846 ()     @ 07:01  -   @ 07:00  IN: 300 mL / OUT: 0 mL / NET: 300 mL    Bacteriology:  CSF studies:  EEG:  Neuroimaging:  s/p angio   R LENNY occlusion, distal R ICA aneurysm, R M1 narrowing, L MCA distal aneurysms ?endocarditis, bilateral PCA stenosis, dominant R VA; no vasculitis  Other imagin/16 CXR: no change    MEDICATIONS: albuterol PRN amlodipine 5mg daily atorvastatin 80mg HS bisacodyl PRN clopidogrel 75mg daily docusate 100 TID SQH q8h miralax q12h senna HS HAART valacyclovir BID    IV FLUIDS: NS@75cc/hr  DRIPS:  DIET: DASH  Lines:    CODE STATUS:  full code                       GOALS OF CARE:  aggressive                      DISPOSITION:  [facility]

## 2018-11-18 NOTE — PROGRESS NOTE ADULT - ASSESSMENT
Sydnee Ennis is a 33F w PMH of HIV, HTN, CVA x2 (2013) with residual dysarthria, R sided facial droop, and R sided upper/lower extremity weakness, and asthma, who was transferred from [facility] for further stroke workup and intervention with MRI showing subacute infarct involving R paramedian parietal occipital region posteriorly and 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

## 2018-11-18 NOTE — CHART NOTE - NSCHARTNOTEFT_GEN_A_CORE
Patient refusing labs, medications, and attempts to examine her with multiple doctors and nurses. Patient informed of risks of doing such.

## 2018-11-19 ENCOUNTER — TRANSCRIPTION ENCOUNTER (OUTPATIENT)
Age: 33
End: 2018-11-19

## 2018-11-19 LAB — ACE SERPL-CCNC: 51 U/L — SIGNIFICANT CHANGE UP (ref 14–82)

## 2018-11-19 PROCEDURE — 93306 TTE W/DOPPLER COMPLETE: CPT | Mod: 26

## 2018-11-19 PROCEDURE — 73560 X-RAY EXAM OF KNEE 1 OR 2: CPT | Mod: 26,LT

## 2018-11-19 PROCEDURE — 99223 1ST HOSP IP/OBS HIGH 75: CPT

## 2018-11-19 PROCEDURE — 99233 SBSQ HOSP IP/OBS HIGH 50: CPT | Mod: GC

## 2018-11-19 RX ORDER — CLOPIDOGREL BISULFATE 75 MG/1
1 TABLET, FILM COATED ORAL
Qty: 30 | Refills: 0 | OUTPATIENT
Start: 2018-11-19 | End: 2018-12-18

## 2018-11-19 RX ORDER — DIPHENHYDRAMINE HCL 50 MG
25 CAPSULE ORAL ONCE
Qty: 0 | Refills: 0 | Status: COMPLETED | OUTPATIENT
Start: 2018-11-19 | End: 2018-11-19

## 2018-11-19 RX ORDER — KETOROLAC TROMETHAMINE 30 MG/ML
10 SYRINGE (ML) INJECTION ONCE
Qty: 0 | Refills: 0 | Status: DISCONTINUED | OUTPATIENT
Start: 2018-11-19 | End: 2018-11-20

## 2018-11-19 RX ORDER — LANOLIN ALCOHOL/MO/W.PET/CERES
5 CREAM (GRAM) TOPICAL ONCE
Qty: 0 | Refills: 0 | Status: COMPLETED | OUTPATIENT
Start: 2018-11-19 | End: 2018-11-20

## 2018-11-19 RX ORDER — KETOROLAC TROMETHAMINE 30 MG/ML
15 SYRINGE (ML) INJECTION ONCE
Qty: 0 | Refills: 0 | Status: DISCONTINUED | OUTPATIENT
Start: 2018-11-19 | End: 2018-11-19

## 2018-11-19 RX ADMIN — ELVITEGRAVIR, COBICISTAT, EMTRICITABINE, AND TENOFOVIR ALAFENAMIDE 1 TABLET(S): 150; 150; 200; 10 TABLET ORAL at 11:34

## 2018-11-19 RX ADMIN — AMLODIPINE BESYLATE 5 MILLIGRAM(S): 2.5 TABLET ORAL at 11:33

## 2018-11-19 RX ADMIN — ATORVASTATIN CALCIUM 80 MILLIGRAM(S): 80 TABLET, FILM COATED ORAL at 23:29

## 2018-11-19 RX ADMIN — Medication 15 MILLIGRAM(S): at 12:00

## 2018-11-19 RX ADMIN — Medication 15 MILLIGRAM(S): at 11:34

## 2018-11-19 RX ADMIN — CLOPIDOGREL BISULFATE 75 MILLIGRAM(S): 75 TABLET, FILM COATED ORAL at 11:33

## 2018-11-19 RX ADMIN — Medication 15 MILLIGRAM(S): at 20:00

## 2018-11-19 RX ADMIN — Medication 25 MILLIGRAM(S): at 01:37

## 2018-11-19 RX ADMIN — VALACYCLOVIR 1000 MILLIGRAM(S): 500 TABLET, FILM COATED ORAL at 11:33

## 2018-11-19 RX ADMIN — Medication 15 MILLIGRAM(S): at 19:46

## 2018-11-19 NOTE — DISCHARGE NOTE ADULT - CARE PROVIDERS DIRECT ADDRESSES
,[email or web address]

## 2018-11-19 NOTE — DIETITIAN INITIAL EVALUATION ADULT. - PROBLEM SELECTOR PLAN 7
1) PCP Contacted on Admission: (Y/N) --> Dr. Jf Ernst (HIV)  [phone]  2) Date of Contact with PCP: Office closed  3) PCP Contacted at Discharge: (Y/N)  4) Summary of Handoff Given to PCP:   5) Post-Discharge Appointment Date and Location:
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
## 2018-11-19 NOTE — BEHAVIORAL HEALTH ASSESSMENT NOTE - NSBHCONSULTRECOMMENDOTHER_PSY_A_CORE FT
Pt requested--and would benefit from--outpt psychiatric referral for psychotherapy and possible antidepressant medication.

## 2018-11-19 NOTE — PROGRESS NOTE ADULT - ASSESSMENT
34 yo female with HIV on Genvoya, hx CVA X 2, found to have R ICA aneurysm. Absence of fever and normal inflammatory marker (CRP) go against mycotic aneurysm.    Recommend: 32 yo female with HIV on Genvoya, hx CVA X 2, found to have R ICA aneurysm. Absence of fever and normal inflammatory marker (CRP) go against mycotic aneurysm. Bcx from 11/17 NGTD. TTE 11/19 no acute path. Afebrile, WBC ct 2.1    Recommend:  - Follow up Bcx results   - Follow up ESR, RPR, QFT, HIV VL and CD4  - If labs are normal with no concern for bact endocarditis, then Iv abx will not be necessary   - Recommend observation off antibiotics at this time

## 2018-11-19 NOTE — PROGRESS NOTE ADULT - ASSESSMENT
Sydnee Ennis is a 33F w PMH of HIV, HTN, CVA x2 (2013) with residual dysarthria, R sided facial droop, and R sided upper/lower extremity weakness, and asthma, who was transferred from [facility] for further stroke workup and intervention with MRI showing subacute infarct involving R paramedian parietal occipital region posteriorly and 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

## 2018-11-19 NOTE — PROGRESS NOTE ADULT - PROBLEM SELECTOR PLAN 4
Patient has albuterol neb at home, rarely uses it. No SOB currently.   - Albuterol PRN

## 2018-11-19 NOTE — DISCHARGE NOTE ADULT - MEDICATION SUMMARY - MEDICATIONS TO TAKE
I will START or STAY ON the medications listed below when I get home from the hospital:    aspirin 81 mg oral tablet  -- 1 tab(s) by mouth once a day  -- Indication: For Stroke    rosuvastatin 20 mg oral tablet  -- 1 tab(s) by mouth once a day (at bedtime)  -- Indication: For Stroke    Genvoya oral tablet  -- 1 tab(s) by mouth once a day  -- Indication: For HIV (human immunodeficiency virus infection)    valACYclovir 1 g oral tablet  -- 1 tab(s) by mouth 2 times a day  -- Indication: For Genital herpes    amLODIPine 5 mg oral tablet  -- 1 tab(s) by mouth once a day  -- Indication: For Essential hypertension    Colace 100 mg oral capsule  -- 1 tab(s) by mouth once a day  -- Indication: For Constipation

## 2018-11-19 NOTE — PROGRESS NOTE ADULT - SUBJECTIVE AND OBJECTIVE BOX
OVERNIGHT EVENTS: Patient received toradol for back pain and benadryl for itchiness.     SUBJECTIVE / INTERVAL HPI: Patient refused to be seen this AM.     VITAL SIGNS:  Vital Signs Last 24 Hrs  T(C): 36.6 (19 Nov 2018 05:25), Max: 36.8 (18 Nov 2018 13:51)  T(F): 97.8 (19 Nov 2018 05:25), Max: 98.2 (18 Nov 2018 13:51)  HR: 54 (19 Nov 2018 05:00) (54 - 80)  BP: 105/76 (19 Nov 2018 05:00) (105/76 - 141/83)  BP(mean): 86 (19 Nov 2018 05:00) (86 - 108)  RR: 16 (19 Nov 2018 05:00) (16 - 16)  SpO2: 95% (19 Nov 2018 05:00) (95% - 97%)    PHYSICAL EXAM:    General:  Patient refused to be seen this AM.     MEDICATIONS:  MEDICATIONS  (STANDING):  amLODIPine   Tablet 5 milliGRAM(s) Oral daily  atorvastatin 80 milliGRAM(s) Oral at bedtime  clopidogrel Tablet 75 milliGRAM(s) Oral daily  docusate sodium 100 milliGRAM(s) Oral three times a day  heparin  Injectable 5000 Unit(s) SubCutaneous every 8 hours  polyethylene glycol 3350 17 Gram(s) Oral every 12 hours  senna 2 Tablet(s) Oral at bedtime  tenofovir alafenamide 10 mG/[id number] mG/cobicistat 150 mG/emtricitabine 200 mG (GENVOYA) 1 Tablet(s) Oral daily  valACYclovir 1000 milliGRAM(s) Oral two times a day    MEDICATIONS  (PRN):  ALBUTerol    90 MICROgram(s) HFA Inhaler 2 Puff(s) Inhalation every 6 hours PRN Shortness of Breath and/or Wheezing  bisacodyl Suppository 10 milliGRAM(s) Rectal daily PRN Constipation      ALLERGIES:  Allergies    morphine (Vomiting)  penicillin (Hives)    Intolerances        LABS:                        12.0   2.1   )-----------( 233      ( 17 Nov 2018 15:05 )             38.5     11-17    133<L>  |  100  |  11  ----------------------------<  81  4.1   |  23  |  0.76    Ca    9.7      17 Nov 2018 15:05  Mg     2.0     11-17          CAPILLARY BLOOD GLUCOSE          RADIOLOGY & ADDITIONAL TESTS: Reviewed.

## 2018-11-19 NOTE — BEHAVIORAL HEALTH ASSESSMENT NOTE - NSBHCONSULTMEDS_PSY_A_CORE FT
Pt would likely benefit from trial of antidepressant rx, but would defer until she is in outpt treatment.

## 2018-11-19 NOTE — PROGRESS NOTE ADULT - PROBLEM SELECTOR PLAN 3
Outside window for permissive HTN.   - c/w home amlodipine.

## 2018-11-19 NOTE — BEHAVIORAL HEALTH ASSESSMENT NOTE - RISK ASSESSMENT
Pt denies suicidal ideation/intent/plan now or ever. She reports remote hx of suicidal ideation when her father  (~10 years ago). However, states even at that time, she never had a plan or intent to harm herself.   Pt denies homicidal ideation/intent/plan now or ever.

## 2018-11-19 NOTE — PROGRESS NOTE ADULT - ATTENDING COMMENTS
Low suspicion for mycotic aneurysm given absence of fever, normal inflammatory marker, negative bcx to date as well as TTE without evidence of vegetations. f/u pending RPR. Continue to observe off antibiotics at this time.  Patient to follow with her HIV provider in [place].  Please reconsult with ?  To see again as requested
patient refusing any further inpatient testing and wants to go home. Cleared by PT and cleared by ID as unlikley having endocarditis. Patient will follow up in the office and will hopefully finish her testing at that time
Patient seen and examined with Resident.  Agree with above.  Patient anxious and worried about her diagnosis.  No change in her complaints.    1) Aspirin 81mg and Atorvastatin 80mg for secondary stroke prevention   2) Workup -   - Consulted Dr. Olmstead for cerebral angiogram to rule out vasculitis  3) DVT prophylaxis     Addressed her concerns

## 2018-11-19 NOTE — DISCHARGE NOTE ADULT - HOSPITAL COURSE
33F w PMH of HIV, HTN, CVA x2 (2013) with residual dysarthria, R sided facial droop, and R sided upper/lower extremity weakness, and asthma, who was transferred from [facility] for further stroke workup and intervention. Patient originally presented to [place] for a severe frontal headache. Also has gait imbalance and hand numbness. She relies on a cane at baseline while walking.  At [place], CT head showed acute/ subacute infarction in R parietal lobe. CTA showed multiple areas of focal/segmental stenosis, paramedian right parieto-occipital region, there is some low attenuation in the L frontal lobe. MRI w w/o showing subacute infarct involving R paramedian parietal occipital region posteriorly. 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 CXR unremarkable. Echo showing mild concentric LV hypertrophy, LVEF 55-60%. Labs were unremarkable. UTox negative. Serologies pending. LP was done, CSF unremarkable. NIHH score 7. Diagnostic angiogram with findings of right LENYN occlusion, right distal ICA aneurysmal dilatation, right M1 narrowing, b/l PCA stenosis, and left MCA aneurysms. 33F w PMH of HIV, HTN, CVA x2 (2013) with residual dysarthria, R sided facial droop, and R sided upper/lower extremity weakness, and asthma, who was transferred from [facility] for further stroke workup and intervention. Patient originally presented to [place] for a severe frontal headache. Also has gait imbalance and hand numbness. She relies on a cane at baseline while walking.  At [place], CT head showed acute/ subacute infarction in R parietal lobe. CTA showed multiple areas of focal/segmental stenosis, paramedian right parieto-occipital region, there is some low attenuation in the L frontal lobe. MRI w w/o showing subacute infarct involving R paramedian parietal occipital region posteriorly. 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 CXR unremarkable. Echo showing mild concentric LV hypertrophy, LVEF 55-60%. Labs were unremarkable. UTox negative. Serologies pending. LP was done, CSF unremarkable. NIHH score 7. Diagnostic angiogram with findings of right LENNY occlusion, right distal ICA aneurysmal dilatation, right M1 narrowing, b/l PCA stenosis, and left MCA aneurysms. Patient was worked up for possible endocarditis with an echo which was negative for vegetations and negative blood cultures. Patient will be discharged on rosuvastatin, ASA, and her home medications. Patient will follow up with Dr. Bethea in 4 weeks and PCP in 2 weeks. Patient given information regarding psychiatry as outpt. A 33F w PMH of HIV, HTN, CVA x2 (2013) with residual dysarthria, R sided facial droop, and R sided upper/lower extremity weakness, and asthma, who was transferred from [facility] for further stroke workup and intervention. Patient originally presented to [place] for a severe frontal headache. Also has gait imbalance and hand numbness. She relies on a cane at baseline while walking.  At [place], CT head showed acute/ subacute infarction in R parietal lobe. CTA showed multiple areas of focal/segmental stenosis, paramedian right parieto-occipital region, there is some low attenuation in the L frontal lobe. MRI w w/o showing subacute infarct involving R paramedian parietal occipital region posteriorly. 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 CXR unremarkable. Echo showing mild concentric LV hypertrophy, LVEF 55-60%. Labs were unremarkable. UTox negative. Serologies pending. LP was done, CSF unremarkable. NIHH score 7. Diagnostic angiogram with findings of right LENNY occlusion, right distal ICA aneurysmal dilatation, right M1 narrowing, b/l PCA stenosis, and left MCA aneurysms. Patient was worked up for possible endocarditis with an echo which was negative for vegetations and negative blood cultures. Patient will be discharged on rosuvastatin, ASA, and her home medications. Patient will follow up with Dr. Bethea in 4 weeks and PCP in 2 weeks. Patient given information regarding psychiatry as outpt.

## 2018-11-19 NOTE — BEHAVIORAL HEALTH ASSESSMENT NOTE - OTHER
Lives with BF. Has 14-year-old daughter. Per team, there are custody issues regarding daughter. s/p CVA States desire to return to [place]. Initially uncooperative and hostile, became more amenable to conversation once informed regarding plan towards discharge by medicine team. In bed Some dysarthria, likely s/p CVA. Initially repeats, "I want to go home." Later, able to engage in conversation regarding her hx and plan towards discharge home. Pt with some possible confusion regarding timeline, i.e. when her various relatives .

## 2018-11-19 NOTE — BEHAVIORAL HEALTH ASSESSMENT NOTE - ORIENTATION OTHER
Pt with some possible confusion regarding timeline, i.e. when her various relatives . Is, however, aware of recent events.

## 2018-11-19 NOTE — BEHAVIORAL HEALTH ASSESSMENT NOTE - PROBLEM SELECTOR PLAN 1
Pt denies suicidal ideation/intent/plan. No need for C.O. No need for inpt psychiatric admission.  Pt would benefit from outpt psychiatric follow up, for psychotherapy and possible antidepressant rx. Since her discharge is imminent, would defer starting any medication until pt is in outpt treatment.  Supportive tx.

## 2018-11-19 NOTE — BEHAVIORAL HEALTH ASSESSMENT NOTE - NSBHREFERDETAILS_PSY_A_CORE_FT
33-year-old female with hx of HIV, HTN, s/p CVA x 2, now admitted from [facility] s/p CVA. Pt refusing daily medications, testing. not willing to be examined. Also wants to leave AMA. Please evaluate, including for capacity to leave AMA.

## 2018-11-19 NOTE — DISCHARGE NOTE ADULT - OTHER SIGNIFICANT FINDINGS
Diagnostic angiogram with findings of right LENNY occlusion, right distal ICA aneurysmal dilatation, right M1 narrowing, b/l PCA stenosis, and left MCA aneurysms    < from: Echocardiogram (11.19.18 @ 10:11) >  The left atrial size is normal.There is mild concentric left ventricular   hypertrophy.The left ventricular wall motion is normal. The left   ventricular   ejection fraction is 70%.  The left atrial pressure is estimated to be   within   normal limits.Right atrial size is normal.The right ventricle is normal   in   size and function.No evidence for any hemodynamically significant   valvular   disease.No aortic root dilatation.The aortic arch is not well seen.There   was   insufficient TRdetected from which to calculate pulmonary artery   systolic   pressure.  There is no pericardial effusion.    < end of copied text >

## 2018-11-19 NOTE — BEHAVIORAL HEALTH ASSESSMENT NOTE - PROBLEM SELECTOR PLAN 2
Pt's irritability decreased s/p discussion with team regarding plan towards discharge.   Would continue to remind pt of "light at the end of the tunnel," to reassure her that she is approaching readiness for discharge home.

## 2018-11-19 NOTE — BEHAVIORAL HEALTH ASSESSMENT NOTE - OTHER PAST PSYCHIATRIC HISTORY (INCLUDE DETAILS REGARDING ONSET, COURSE OF ILLNESS, INPATIENT/OUTPATIENT TREATMENT)
Pt denies prior inpt/outpt psychiatric rx/tx.    She reports remote hx of suicidal ideation (though no intent/plan/attempt) s/p the death of her father ~10 years ago.

## 2018-11-19 NOTE — DISCHARGE NOTE ADULT - MEDICATION SUMMARY - MEDICATIONS TO STOP TAKING
I will STOP taking the medications listed below when I get home from the hospital:    Bactrim  mg-160 mg oral tablet  -- 1 tab(s) by mouth once a day

## 2018-11-19 NOTE — PROGRESS NOTE ADULT - PROBLEM SELECTOR PLAN 2
Patient takes Genvoya at home.  - c/w genvoya 1 tab qD  - f/u HIV labs

## 2018-11-19 NOTE — BEHAVIORAL HEALTH ASSESSMENT NOTE - NSBHADMITCOUNSEL_PSY_A_CORE
risks and benefits of treatment options/client/family/caregiver education/prognosis/diagnostic results/impressions and/or recommended studies/importance of adherence to chosen treatment/instructions for management, treatment and follow up/risk factor reduction

## 2018-11-19 NOTE — DISCHARGE NOTE ADULT - PROVIDER TOKENS
LOS:'[id number]' TOKEN:'[id number]',FREE:[LAST:[Klever],FIRST:[Jf],PHONE:[phone]],FAX:[(   )    -],ADDRESS:[[place] [id number]]]

## 2018-11-19 NOTE — BEHAVIORAL HEALTH ASSESSMENT NOTE - NSBHSUICPROTECTFACT_PSY_A_CORE
Responsibility to family and others/Other/Identifies reasons for living/Future oriented/Supportive social network or family

## 2018-11-19 NOTE — PROGRESS NOTE ADULT - ASSESSMENT
33F w PMH of HIV, HTN, CVA x2 (2013) with residual dysarthria, R sided facial droop, and R sided upper/lower extremity weakness, and asthma, who was transferred from [facility] for further stroke workup and intervention with MRI showing subacute infarct involving R paramedian parietal occipital region posteriorly and 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    Problem/Plan - 1:  ·  Problem: Stroke.  Plan: Admitted 2 days ago to [place] with headache that has since resolved. Initial NIHH score 7. 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 According to pt, no new deficits. Hx of 2 strokes in 2013.  - Echo at [place] showed normal EF, no evidence of thrombus  - will c/w atorva 80  - will change aspirin to plavix as pt developed stroke on aspirin.   - As per neurosurgery note : Diagnostic angiogram with findings of right LENNY occlusion, right distal ICA aneurysmal dilatation, right M1 narrowing, b/l PCA stenosis, and left MCA aneurysms,  -As per Neurosurgery -consider r/o endocarditis giving appearance and location of left MCA aneurysms  - echo today  - 1 set of blood cx, patient refused further blood draws  - ID consulted, appreciate recs, no abx for now  - Patient refusing examination by nursing staff and neurology team.    Problem/Plan - 2:  ·  Problem: HIV (human immunodeficiency virus infection).  Plan: Patient takes Genvoya at home.  - c/w genvoya 1 tab qD  - f/u HIV labs.

## 2018-11-19 NOTE — DISCHARGE NOTE ADULT - SECONDARY DIAGNOSIS.
HIV (human immunodeficiency virus infection) Essential hypertension Mild intermittent asthma without complication Genital herpes Depressed mood

## 2018-11-19 NOTE — PROGRESS NOTE ADULT - SUBJECTIVE AND OBJECTIVE BOX
infectious diseases progress note:  MARQUEZ VELAZQUEZ is a 33yFemale patient seen and eval bedside. Refuses to speak.    STROKE    Transition of care performed with sharing of clinical summary  Need for prophylactic measure  Nutrition, metabolism, and development symptoms  Mild intermittent asthma without complication  Essential hypertension  HIV (human immunodeficiency virus infection)  Stroke      ROS:  Unable to obtain     Allergies    morphine (Vomiting)  penicillin (Hives)    Intolerances  ANTIBIOTICS/RELEVANT:  antimicrobials  tenofovir alafenamide 10 mG/attleytypxwl791 mG/cobicistat 150 mG/emtricitabine 200 mG (GENVOYA) 1 Tablet(s) Oral daily  valACYclovir 1000 milliGRAM(s) Oral two times a day    immunologic:    OTHER:  ALBUTerol    90 MICROgram(s) HFA Inhaler 2 Puff(s) Inhalation every 6 hours PRN  amLODIPine   Tablet 5 milliGRAM(s) Oral daily  atorvastatin 80 milliGRAM(s) Oral at bedtime  bisacodyl Suppository 10 milliGRAM(s) Rectal daily PRN  clopidogrel Tablet 75 milliGRAM(s) Oral daily  docusate sodium 100 milliGRAM(s) Oral three times a day  heparin  Injectable 5000 Unit(s) SubCutaneous every 8 hours  polyethylene glycol 3350 17 Gram(s) Oral every 12 hours  senna 2 Tablet(s) Oral at bedtime    Objective:  Vital Signs Last 24 Hrs  T(C): 35.7 (19 Nov 2018 09:14), Max: 36.8 (18 Nov 2018 13:51)  T(F): 96.3 (19 Nov 2018 09:14), Max: 98.2 (18 Nov 2018 13:51)  HR: 72 (19 Nov 2018 09:06) (54 - 80)  BP: 123/76 (19 Nov 2018 09:06) (105/76 - 141/83)  BP(mean): 94 (19 Nov 2018 09:06) (86 - 108)  RR: 18 (19 Nov 2018 09:06) (16 - 18)  SpO2: 95% (19 Nov 2018 09:06) (95% - 96%)    PHYSICAL EXAM:  Constitutional:Well-developed, well nourished  Eyes:KELLY, EOMI  Ear/Nose/Throat: no oral lesion, no sinus tenderness on percussion	  Neck:no JVD, no lymphadenopathy, supple  Respiratory: CTA jaime  Cardiovascular: S1S2 RRR, no murmurs  Gastrointestinal:soft, (+) BS, no HSM  Extremities:no e/e/c  Vascular: DP Pulse:	right normal; left normal    LABS:                        12.0   2.1   )-----------( 233      ( 17 Nov 2018 15:05 )             38.5     11-17    133<L>  |  100  |  11  ----------------------------<  81  4.1   |  23  |  0.76    Ca    9.7      17 Nov 2018 15:05  Mg     2.0     11-17    MICROBIOLOGY:  Culture - Blood (11.17.18 @ 22:22)    Specimen Source: .Blood Blood-Peripheral    Culture Results:   No growth at 1 day.    RADIOLOGY & ADDITIONAL STUDIES:  < from: Xray Chest 1 View- PORTABLE-Routine (11.16.18 @ 07:20) >  IMPRESSION: Frontal view of the chest is compared to 11/13/2018 and   demonstrates no interval change in prominence of the main pulmonary   artery, possibly due to pulmonary arterial hypertension. Midline trachea.   Normal heart size. No consolidation. No pleural effusion.    < end of copied text > infectious diseases progress note:  MARQUEZ VELAZQUEZ is a 33yFemale patient seen and eval bedside. Refuses to speak.    STROKE    Transition of care performed with sharing of clinical summary  Need for prophylactic measure  Nutrition, metabolism, and development symptoms  Mild intermittent asthma without complication  Essential hypertension  HIV (human immunodeficiency virus infection)  Stroke    ROS:  Unable to obtain     Allergies    morphine (Vomiting)  penicillin (Hives)    Intolerances  ANTIBIOTICS/RELEVANT:  antimicrobials  tenofovir alafenamide 10 mG/[id number] mG/cobicistat 150 mG/emtricitabine 200 mG (GENVOYA) 1 Tablet(s) Oral daily  valACYclovir 1000 milliGRAM(s) Oral two times a day    immunologic:    OTHER:  ALBUTerol    90 MICROgram(s) HFA Inhaler 2 Puff(s) Inhalation every 6 hours PRN  amLODIPine   Tablet 5 milliGRAM(s) Oral daily  atorvastatin 80 milliGRAM(s) Oral at bedtime  bisacodyl Suppository 10 milliGRAM(s) Rectal daily PRN  clopidogrel Tablet 75 milliGRAM(s) Oral daily  docusate sodium 100 milliGRAM(s) Oral three times a day  heparin  Injectable 5000 Unit(s) SubCutaneous every 8 hours  polyethylene glycol 3350 17 Gram(s) Oral every 12 hours  senna 2 Tablet(s) Oral at bedtime    Objective:  Vital Signs Last 24 Hrs  T(C): 35.7 (19 Nov 2018 09:14), Max: 36.8 (18 Nov 2018 13:51)  T(F): 96.3 (19 Nov 2018 09:14), Max: 98.2 (18 Nov 2018 13:51)  HR: 72 (19 Nov 2018 09:06) (54 - 80)  BP: 123/76 (19 Nov 2018 09:06) (105/76 - 141/83)  BP(mean): 94 (19 Nov 2018 09:06) (86 - 108)  RR: 18 (19 Nov 2018 09:06) (16 - 18)  SpO2: 95% (19 Nov 2018 09:06) (95% - 96%)    PHYSICAL EXAM:  Constitutional:Well-developed, well nourished  Eyes:KELLY, EOMI  Ear/Nose/Throat: no oral lesion, no sinus tenderness on percussion	  Neck:no JVD, no lymphadenopathy, supple  Respiratory: CTA jaime  Cardiovascular: S1S2 RRR, no murmurs  Gastrointestinal:soft, (+) BS, no HSM  Extremities:no e/e/c  Vascular: DP Pulse:	right normal; left normal    LABS:                        12.0   2.1   )-----------( 233      ( 17 Nov 2018 15:05 )             38.5     11-17    133<L>  |  100  |  11  ----------------------------<  81  4.1   |  23  |  0.76    Ca    9.7      17 Nov 2018 15:05  Mg     2.0     11-17    MICROBIOLOGY:  Culture - Blood (11.17.18 @ 22:22)    Specimen Source: .Blood Blood-Peripheral    Culture Results:   No growth at 1 day.    RADIOLOGY & ADDITIONAL STUDIES:  < from: Xray Chest 1 View- PORTABLE-Routine (11.16.18 @ 07:20) >  IMPRESSION: Frontal view of the chest is compared to 11/13/2018 and   demonstrates no interval change in prominence of the main pulmonary   artery, possibly due to pulmonary arterial hypertension. Midline trachea.   Normal heart size. No consolidation. No pleural effusion.    < end of copied text >

## 2018-11-19 NOTE — PROGRESS NOTE ADULT - PROBLEM SELECTOR PLAN 1
Admitted 2 days ago to [facility] with headache that has since resolved. Initial NIHH score 7. 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 According to pt, no new deficits. Hx of 2 strokes in 2013.  - Echo at [facility] showed normal EF, no evidence of thrombus  - will c/w atorva 80  - will change aspirin to plavix as pt developed stroke on aspirin.   - As per neurosurgery note : Diagnostic angiogram with findings of right LENNY occlusion, right distal ICA aneurysmal dilatation, right M1 narrowing, b/l PCA stenosis, and left MCA aneurysms,  -As per Neurosurgery -consider r/o endocarditis giving appearance and location of left MCA aneurysms  -echo  - 1 set of blood cx, patient refused further blood draws  - ID consulted, appreciate recs, no abx for now  - Patient refusing examination by nursing staff and neurology team. Admitted 2 days ago to [facility] with headache that has since resolved. Initial NIHH score 7. 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 According to pt, no new deficits. Hx of 2 strokes in 2013.  - Echo at [facility] showed normal EF, no evidence of thrombus  - will c/w atorva 80  - will change aspirin to plavix as pt developed stroke on aspirin.   - As per neurosurgery note : Diagnostic angiogram with findings of right LENNY occlusion, right distal ICA aneurysmal dilatation, right M1 narrowing, b/l PCA stenosis, and left MCA aneurysms,  -As per Neurosurgery -consider r/o endocarditis giving appearance and location of left MCA aneurysms  - echo today  - 1 set of blood cx, patient refused further blood draws  - ID consulted, appreciate recs, no abx for now  - Patient refusing examination by nursing staff and neurology team.

## 2018-11-19 NOTE — DISCHARGE NOTE ADULT - CARE PLAN
Principal Discharge DX:	Stroke  Secondary Diagnosis:	HIV (human immunodeficiency virus infection)  Secondary Diagnosis:	Essential hypertension  Secondary Diagnosis:	Mild intermittent asthma without complication  Secondary Diagnosis:	Genital herpes  Assessment and plan of treatment:	Please continue taking valtrex 1000mg Principal Discharge DX:	Stroke  Assessment and plan of treatment:	Please follow up with Dr. Hammond in 4 weeks. Please take your Plavix 75mg daily and lipitor 80mg daily.  Secondary Diagnosis:	HIV (human immunodeficiency virus infection)  Assessment and plan of treatment:	Please continue taking your genvoya daily as directed  Secondary Diagnosis:	Essential hypertension  Assessment and plan of treatment:	Please continue taking your amlodpine 5mg daily as directed  Secondary Diagnosis:	Mild intermittent asthma without complication  Assessment and plan of treatment:	Please continue using your albuterol inhaler as needed  Secondary Diagnosis:	Genital herpes  Assessment and plan of treatment:	Please continue taking valtrex 1000mg for 2 more days Principal Discharge DX:	Stroke  Goal:	Prevention of further strokes  Assessment and plan of treatment:	You had a stroke involving the right occipital region of your brain. On the angiogram test, you also had several aneurysms which may be the cause of your strokes. Please follow up with Dr. Bethea's office on Monday 12/17th at 1pm. Please take your aspirin 81mg daily and rosuvastatin 20mg daily. You may need repeat testing or imaging tests thereafter.  Secondary Diagnosis:	HIV (human immunodeficiency virus infection)  Assessment and plan of treatment:	Please continue taking your genvoya daily as directed.  Secondary Diagnosis:	Essential hypertension  Assessment and plan of treatment:	Please continue taking your amlodpine 5mg daily as directed.  Secondary Diagnosis:	Mild intermittent asthma without complication  Assessment and plan of treatment:	Please continue using your albuterol inhaler as needed.  Secondary Diagnosis:	Genital herpes  Assessment and plan of treatment:	Please continue taking valtrex 1000mg for 2 more days.  Secondary Diagnosis:	Depressed mood  Assessment and plan of treatment:	You were seen by psychiatry due to your depressed mood. You were given information outpatient follow up in [place]. Please call this facility when you go home and arrange to be seen. Principal Discharge DX:	Stroke  Goal:	Prevention of further strokes  Assessment and plan of treatment:	You had a stroke involving the right occipital region of your brain. On the angiogram test, you also had several aneurysms which may be the cause of your strokes. Please follow up with Dr. Bethea's office on Monday 12/17th at 1pm. Please take your aspirin 81mg daily and Rosuvastatin 20mg daily. You may need repeat testing or imaging tests thereafter.  Secondary Diagnosis:	HIV (human immunodeficiency virus infection)  Goal:	Manage HIV  Assessment and plan of treatment:	Please continue taking your Genvoya daily as directed.  Secondary Diagnosis:	Essential hypertension  Goal:	Manage blood pressure  Assessment and plan of treatment:	Please continue taking your Amlodipine 5mg daily as directed.  Secondary Diagnosis:	Mild intermittent asthma without complication  Goal:	Improve symptoms  Assessment and plan of treatment:	Please continue using your albuterol inhaler as needed.  Secondary Diagnosis:	Genital herpes  Goal:	Manage infection  Assessment and plan of treatment:	Please continue taking Valtrex 1000mg for 2 more days.  Secondary Diagnosis:	Depressed mood  Goal:	Improve symptoms  Assessment and plan of treatment:	You were seen by psychiatry due to your depressed mood. You were given information outpatient follow up in [place]. Please call this facility when you go home and arrange to be seen.

## 2018-11-19 NOTE — DISCHARGE NOTE ADULT - ADDITIONAL INSTRUCTIONS
Please follow up with Dr. Bethea's office on 12/17 at 1pm. Please follow with Dr. Ernst within 2 weeks, his office was called but schedule a follow up appointment if you do not hear from them. You were given information outpatient follow up for psychiatry in [place]. Please call this facility when you go home and arrange to be seen.

## 2018-11-19 NOTE — DISCHARGE NOTE ADULT - CARE PROVIDER_API CALL
Kelsea Hammond), Neurology; Vascular Neurology  [address], [place] [address]  Phone: [phone]  Fax: [phone] Romulo Bethea), Neurology; Vascular Neurology  [address] [place] [address]  Phone: [phone]  Fax: [phone]    Jf Ernst  [place] [address]  Phone: [phone]  Fax: (       -

## 2018-11-19 NOTE — BEHAVIORAL HEALTH ASSESSMENT NOTE - SUMMARY
33-year-old female w PMH of HIV, HTN, CVA x2 (2013) with residual dysarthria, R sided facial droop, and R sided upper/lower extremity weakness, and asthma, now transferred from [facility] for work-up and treatment of another CVA.  Pt has been unengageable, intermittently refusing to cooperate with work up, as well as insisting that she wants to leave AMA.  Once discharge planning and rationale for remaining in hospital explained to pt, she calmed down, became more cooperative with psychiatric interview, as well as more accepting of need to remain in hospital until work up results are evaluated.  Pt also acknowledged feeling depressed, expressing interest in outpt follow up. 33-year-old female w PMH of HIV, HTN, CVA x2 () with residual dysarthria, R sided facial droop, and R sided upper/lower extremity weakness, and asthma, now transferred from [facility] for work-up and treatment of another CVA.  Pt has been unengageable, intermittently refusing to cooperate with work up, as well as insisting that she wants to leave AMA.  Once discharge planning and rationale for remaining in hospital explained to pt, she calmed down, became more cooperative with psychiatric interview, as well as more accepting of need to remain in hospital until work up results are evaluated.  Pt also acknowledged feeling depressed, though denies any thoughts of self-harm or suicidality.  Pt's depression has multiple potential etiologies, including s/p CVA, chronic illness, reported custody issues regarding her daughter, as well as prolonged bereavement for multiple  relatives.  Pt would benefit from outpt follow up for supportive psychotherapy and possibly antidepressant medication.

## 2018-11-19 NOTE — DIETITIAN INITIAL EVALUATION ADULT. - OTHER INFO
34 yo/female with PMHx HIV, HTN, CVA w/residual deficits, PCP PNA, admitted to [facility] with CVA. Transferred for CVA w/u. S/p angiogram on 11/16. CTH showing acute/subacute infarct in R. parietal and large aneurysm in distal R. ICA. Pt seen in room, awake, alert. Pt not very talkative at this time and noted to have been refusing interviews from other practitioners all morning. She was willing to talk a little bit, noting that she has been eating "okay" at home PTA but did not elaborate. Noted w/~50% intake on breakfast tray this AM. No further N/V reported but noted w/N/V on 11/18- on zofran. No BM reported. NKFA. Skin intact pressure-wise. Pt denies difficulty chewing or swallowing. No pain reported currently. Pt amenable to ONS, as she states that she really likes Ensure. Encouraged PO intake.

## 2018-11-19 NOTE — BEHAVIORAL HEALTH ASSESSMENT NOTE - NSBHCONSULTFOLLOWAFTERCARE_PSY_A_CORE FT
Will attempt to obtain information regarding outpt psychiatric follow up at [facility].

## 2018-11-19 NOTE — PROGRESS NOTE ADULT - PROBLEM SELECTOR PLAN 7
1) PCP Contacted on Admission: (Y/N) --> Dr. Jf Ernst (HIV)  [phone]  2) Date of Contact with PCP: Office closed  3) PCP Contacted at Discharge: (Y/N)  4) Summary of Handoff Given to PCP:   5) Post-Discharge Appointment Date and Location:
1) PCP Contacted on Admission: (Y/N) --> Dr. Jf Ernst (HIV)  [phone]  2) Date of Contact with PCP: Office closed  3) PCP Contacted at Discharge: (Y/N)  4) Summary of Handoff Given to PCP:   5) Post-Discharge Appointment Date and Location:
1) PCP Contacted on Admission: (Y/N) --> Dr. Jf Ernst (HIV)  [phone]  2) Date of Contact with PCP: Office closed  3) PCP Contacted at Discharge: (Y/N)  4) Summary of Handoff Given to PCP:   5) Post-Discharge Appointment Date and Location:
1) PCP Contacted on Admission: (Y/N) --> Dr. Jf Ernst (HIV)  [phone]  2) Date of Contact with PCP: Office closed  3) PCP Contacted at Discharge: (Y/N)  4) Summary of Handoff Given to PCP:   5) Post-Discharge Appointment Date and Location:
1) PCP Contacted on Admission: (Y/N) --> Dr. Jf Ernst (HIV)  [phone]  2) Date of Contact with PCP: Office closed  3) PCP Contacted at Discharge: (Y/N)  4) Summary of Handoff Given to PCP:   5) Post-Discharge Appointment Date and Location:

## 2018-11-19 NOTE — BEHAVIORAL HEALTH ASSESSMENT NOTE - DIFFERENTIAL
Mood Disorder Due to CVA  Depression, Moderate  Adjustment Disorder with Depressed Mood  Cannabis Abuse
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
## 2018-11-19 NOTE — DIETITIAN INITIAL EVALUATION ADULT. - ENERGY NEEDS
Height 67"; .3#; #; 106%IBW  BMI 22.4   ABW used for calculations as pt between % of IBW. Increased protein-calorie needs for HIV and s/p CVA

## 2018-11-19 NOTE — DISCHARGE NOTE ADULT - PATIENT PORTAL LINK FT
You can access the [facility], offered by [facility], by registering with the following website: [email or web address]
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
## 2018-11-19 NOTE — DISCHARGE NOTE ADULT - PLAN OF CARE
Please continue taking valtrex 1000mg Please follow up with Dr. Hammond in 4 weeks. Please take your Plavix 75mg daily and lipitor 80mg daily. Please continue taking your genvoya daily as directed Please continue taking your amlodpine 5mg daily as directed Please continue using your albuterol inhaler as needed Please continue taking valtrex 1000mg for 2 more days Prevention of further strokes You had a stroke involving the right occipital region of your brain. On the angiogram test, you also had several aneurysms which may be the cause of your strokes. Please follow up with Dr. Bethea's office on Monday 12/17th at 1pm. Please take your aspirin 81mg daily and rosuvastatin 20mg daily. You may need repeat testing or imaging tests thereafter. Please continue taking your genvoya daily as directed. Please continue taking your amlodpine 5mg daily as directed. Please continue using your albuterol inhaler as needed. Please continue taking valtrex 1000mg for 2 more days. You were seen by psychiatry due to your depressed mood. You were given information outpatient follow up in [place]. Please call this facility when you go home and arrange to be seen. You had a stroke involving the right occipital region of your brain. On the angiogram test, you also had several aneurysms which may be the cause of your strokes. Please follow up with Dr. Bethea's office on Monday 12/17th at 1pm. Please take your aspirin 81mg daily and Rosuvastatin 20mg daily. You may need repeat testing or imaging tests thereafter. Manage HIV Please continue taking your Genvoya daily as directed. Manage blood pressure Please continue taking your Amlodipine 5mg daily as directed. Improve symptoms Manage infection Please continue taking Valtrex 1000mg for 2 more days.

## 2018-11-19 NOTE — DIETITIAN INITIAL EVALUATION ADULT. - PROBLEM SELECTOR PLAN 1
Admitted 2 days ago to [facility] with headache that has since resolved.  NIHH 7 . 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 According to pt, no new deficits. Hx of 2 strokes in 2013.  - Echo at [facility] showed normal EF, no evidence of thrombus  - PT/OT consulted  - will c/w atorva 80 in place of rosuvastatin  - EKG unremarkable  - c/w ASA  - 1mg/kg of lovenox sQ

## 2018-11-19 NOTE — BEHAVIORAL HEALTH ASSESSMENT NOTE - DETAILS
Pt reports she had suicidal ideation (but no intent/plan/attempt) s/p death or her father in 2008. Unclear regarding whether ACS is involved, but per team, pt is having custody issues regarding her 14-year-old daughter.

## 2018-11-19 NOTE — BEHAVIORAL HEALTH ASSESSMENT NOTE - NSBHCHARTREVIEWVS_PSY_A_CORE FT
ICU Vital Signs Last 24 Hrs  T(C): 36.7 (19 Nov 2018 21:22), Max: 36.9 (19 Nov 2018 17:47)  T(F): 98.1 (19 Nov 2018 21:22), Max: 98.4 (19 Nov 2018 17:47)  HR: 94 (19 Nov 2018 17:35) (54 - 94)  BP: 116/78 (19 Nov 2018 17:35) (105/76 - 123/76)  BP(mean): 91 (19 Nov 2018 17:35) (86 - 104)  ABP: --  ABP(mean): --  RR: 16 (19 Nov 2018 17:35) (16 - 18)  SpO2: 95% (19 Nov 2018 09:06) (95% - 95%)

## 2018-11-20 VITALS — TEMPERATURE: 98 F

## 2018-11-20 DIAGNOSIS — F32.9 MAJOR DEPRESSIVE DISORDER, SINGLE EPISODE, UNSPECIFIED: ICD-10-CM

## 2018-11-20 DIAGNOSIS — I63.9 CEREBRAL INFARCTION, UNSPECIFIED: ICD-10-CM

## 2018-11-20 DIAGNOSIS — R45.4 IRRITABILITY AND ANGER: ICD-10-CM

## 2018-11-20 PROBLEM — Z00.00 ENCOUNTER FOR PREVENTIVE HEALTH EXAMINATION: Status: ACTIVE | Noted: 2018-11-20

## 2018-11-20 PROBLEM — B20 HUMAN IMMUNODEFICIENCY VIRUS [HIV] DISEASE: Chronic | Status: ACTIVE | Noted: 2018-11-14

## 2018-11-20 PROBLEM — J45.20 MILD INTERMITTENT ASTHMA, UNCOMPLICATED: Chronic | Status: ACTIVE | Noted: 2018-11-14

## 2018-11-20 PROBLEM — I10 ESSENTIAL (PRIMARY) HYPERTENSION: Chronic | Status: ACTIVE | Noted: 2018-11-14

## 2018-11-20 LAB
ANION GAP SERPL CALC-SCNC: 7 MMOL/L — SIGNIFICANT CHANGE UP (ref 5–17)
BASOPHILS NFR BLD AUTO: 0.4 % — SIGNIFICANT CHANGE UP (ref 0–2)
BUN SERPL-MCNC: 17 MG/DL — SIGNIFICANT CHANGE UP (ref 7–23)
CALCIUM SERPL-MCNC: 9 MG/DL — SIGNIFICANT CHANGE UP (ref 8.4–10.5)
CHLORIDE SERPL-SCNC: 104 MMOL/L — SIGNIFICANT CHANGE UP (ref 96–108)
CO2 SERPL-SCNC: 27 MMOL/L — SIGNIFICANT CHANGE UP (ref 22–31)
CREAT SERPL-MCNC: 0.83 MG/DL — SIGNIFICANT CHANGE UP (ref 0.5–1.3)
CRP SERPL-MCNC: <0.03 MG/DL — SIGNIFICANT CHANGE UP (ref 0–0.4)
EOSINOPHIL NFR BLD AUTO: 0.4 % — SIGNIFICANT CHANGE UP (ref 0–6)
ERYTHROCYTE [SEDIMENTATION RATE] IN BLOOD: 62 MM/HR — HIGH
GLUCOSE SERPL-MCNC: 94 MG/DL — SIGNIFICANT CHANGE UP (ref 70–99)
HCT VFR BLD CALC: 34.5 % — SIGNIFICANT CHANGE UP (ref 34.5–45)
HGB BLD-MCNC: 10.7 G/DL — LOW (ref 11.5–15.5)
LYMPHOCYTES # BLD AUTO: 37.4 % — SIGNIFICANT CHANGE UP (ref 13–44)
MAGNESIUM SERPL-MCNC: 1.8 MG/DL — SIGNIFICANT CHANGE UP (ref 1.6–2.6)
MCHC RBC-ENTMCNC: 24.6 PG — LOW (ref 27–34)
MCHC RBC-ENTMCNC: 31 G/DL — LOW (ref 32–36)
MCV RBC AUTO: 79.3 FL — LOW (ref 80–100)
MONOCYTES NFR BLD AUTO: 10.9 % — SIGNIFICANT CHANGE UP (ref 2–14)
NEUTROPHILS NFR BLD AUTO: 50.9 % — SIGNIFICANT CHANGE UP (ref 43–77)
PLATELET # BLD AUTO: 200 K/UL — SIGNIFICANT CHANGE UP (ref 150–400)
POTASSIUM SERPL-MCNC: 3.9 MMOL/L — SIGNIFICANT CHANGE UP (ref 3.5–5.3)
POTASSIUM SERPL-SCNC: 3.9 MMOL/L — SIGNIFICANT CHANGE UP (ref 3.5–5.3)
RBC # BLD: 4.35 M/UL — SIGNIFICANT CHANGE UP (ref 3.8–5.2)
RBC # FLD: 16.7 % — SIGNIFICANT CHANGE UP (ref 10.3–16.9)
SODIUM SERPL-SCNC: 138 MMOL/L — SIGNIFICANT CHANGE UP (ref 135–145)
URATE SERPL-MCNC: 5.7 MG/DL — SIGNIFICANT CHANGE UP (ref 2.5–7)
WBC # BLD: 2.3 K/UL — LOW (ref 3.8–10.5)
WBC # FLD AUTO: 2.3 K/UL — LOW (ref 3.8–10.5)

## 2018-11-20 RX ORDER — ASPIRIN/CALCIUM CARB/MAGNESIUM 324 MG
1 TABLET ORAL
Qty: 0 | Refills: 0 | COMMUNITY

## 2018-11-20 RX ORDER — ROSUVASTATIN CALCIUM 5 MG/1
1 TABLET ORAL
Qty: 30 | Refills: 0 | OUTPATIENT
Start: 2018-11-20 | End: 2018-12-19

## 2018-11-20 RX ORDER — ROSUVASTATIN CALCIUM 5 MG/1
1 TABLET ORAL
Qty: 0 | Refills: 0 | COMMUNITY

## 2018-11-20 RX ORDER — VALACYCLOVIR 500 MG/1
1 TABLET, FILM COATED ORAL
Qty: 0 | Refills: 0 | COMMUNITY

## 2018-11-20 RX ORDER — ASPIRIN/CALCIUM CARB/MAGNESIUM 324 MG
1 TABLET ORAL
Qty: 30 | Refills: 0 | OUTPATIENT
Start: 2018-11-20 | End: 2018-12-19

## 2018-11-20 RX ORDER — ONDANSETRON 8 MG/1
4 TABLET, FILM COATED ORAL ONCE
Qty: 0 | Refills: 0 | Status: COMPLETED | OUTPATIENT
Start: 2018-11-20 | End: 2018-11-20

## 2018-11-20 RX ORDER — VALACYCLOVIR 500 MG/1
1 TABLET, FILM COATED ORAL
Qty: 8 | Refills: 0 | OUTPATIENT
Start: 2018-11-20 | End: 2018-11-23

## 2018-11-20 RX ORDER — AZTREONAM 2 G
1 VIAL (EA) INJECTION
Qty: 0 | Refills: 0 | COMMUNITY

## 2018-11-20 RX ORDER — MAGNESIUM SULFATE 500 MG/ML
2 VIAL (ML) INJECTION ONCE
Qty: 0 | Refills: 0 | Status: COMPLETED | OUTPATIENT
Start: 2018-11-20 | End: 2018-11-20

## 2018-11-20 RX ADMIN — AMLODIPINE BESYLATE 5 MILLIGRAM(S): 2.5 TABLET ORAL at 06:44

## 2018-11-20 RX ADMIN — Medication 10 MILLIGRAM(S): at 00:45

## 2018-11-20 RX ADMIN — Medication 10 MILLIGRAM(S): at 00:12

## 2018-11-20 RX ADMIN — ELVITEGRAVIR, COBICISTAT, EMTRICITABINE, AND TENOFOVIR ALAFENAMIDE 1 TABLET(S): 150; 150; 200; 10 TABLET ORAL at 11:10

## 2018-11-20 RX ADMIN — ONDANSETRON 4 MILLIGRAM(S): 8 TABLET, FILM COATED ORAL at 17:18

## 2018-11-20 RX ADMIN — Medication 5 MILLIGRAM(S): at 00:12

## 2018-11-20 RX ADMIN — VALACYCLOVIR 500 MILLIGRAM(S): 500 TABLET, FILM COATED ORAL at 06:44

## 2018-11-20 RX ADMIN — CLOPIDOGREL BISULFATE 75 MILLIGRAM(S): 75 TABLET, FILM COATED ORAL at 11:10

## 2018-11-21 LAB
GAMMA INTERFERON BACKGROUND BLD IA-ACNC: 0.03 IU/ML — SIGNIFICANT CHANGE UP
M TB IFN-G BLD-IMP: NEGATIVE — SIGNIFICANT CHANGE UP
M TB IFN-G CD4+ BCKGRND COR BLD-ACNC: 0 IU/ML — SIGNIFICANT CHANGE UP
M TB IFN-G CD4+CD8+ BCKGRND COR BLD-ACNC: 0 IU/ML — SIGNIFICANT CHANGE UP
QUANT TB PLUS MITOGEN MINUS NIL: 5.27 IU/ML — SIGNIFICANT CHANGE UP

## 2018-11-22 LAB
CULTURE RESULTS: SIGNIFICANT CHANGE UP
SPECIMEN SOURCE: SIGNIFICANT CHANGE UP

## 2018-11-23 ENCOUNTER — INBOUND DOCUMENT (OUTPATIENT)
Age: 33
End: 2018-11-23

## 2018-11-23 LAB — ACE SERPL-CCNC: 34 U/L — SIGNIFICANT CHANGE UP (ref 14–82)

## 2018-11-27 DIAGNOSIS — I63.9 CEREBRAL INFARCTION, UNSPECIFIED: ICD-10-CM

## 2018-11-27 DIAGNOSIS — I72.0 ANEURYSM OF CAROTID ARTERY: ICD-10-CM

## 2018-11-27 DIAGNOSIS — I69.351 HEMIPLEGIA AND HEMIPARESIS FOLLOWING CEREBRAL INFARCTION AFFECTING RIGHT DOMINANT SIDE: ICD-10-CM

## 2018-11-27 DIAGNOSIS — I10 ESSENTIAL (PRIMARY) HYPERTENSION: ICD-10-CM

## 2018-11-27 DIAGNOSIS — F06.30 MOOD DISORDER DUE TO KNOWN PHYSIOLOGICAL CONDITION, UNSPECIFIED: ICD-10-CM

## 2018-11-27 DIAGNOSIS — J45.20 MILD INTERMITTENT ASTHMA, UNCOMPLICATED: ICD-10-CM

## 2018-11-27 DIAGNOSIS — A60.00 HERPESVIRAL INFECTION OF UROGENITAL SYSTEM, UNSPECIFIED: ICD-10-CM

## 2018-11-27 DIAGNOSIS — I69.392 FACIAL WEAKNESS FOLLOWING CEREBRAL INFARCTION: ICD-10-CM

## 2018-11-27 DIAGNOSIS — K59.00 CONSTIPATION, UNSPECIFIED: ICD-10-CM

## 2018-11-27 DIAGNOSIS — Z21 ASYMPTOMATIC HUMAN IMMUNODEFICIENCY VIRUS [HIV] INFECTION STATUS: ICD-10-CM

## 2018-11-27 DIAGNOSIS — Z88.0 ALLERGY STATUS TO PENICILLIN: ICD-10-CM

## 2018-11-27 DIAGNOSIS — I69.398 OTHER SEQUELAE OF CEREBRAL INFARCTION: ICD-10-CM

## 2018-12-17 ENCOUNTER — APPOINTMENT (OUTPATIENT)
Dept: NEUROLOGY | Facility: CLINIC | Age: 33
End: 2018-12-17

## 2019-01-09 PROCEDURE — 85610 PROTHROMBIN TIME: CPT

## 2019-01-09 PROCEDURE — 82164 ANGIOTENSIN I ENZYME TEST: CPT

## 2019-01-09 PROCEDURE — C1894: CPT

## 2019-01-09 PROCEDURE — 86900 BLOOD TYPING SEROLOGIC ABO: CPT

## 2019-01-09 PROCEDURE — 85652 RBC SED RATE AUTOMATED: CPT

## 2019-01-09 PROCEDURE — 83036 HEMOGLOBIN GLYCOSYLATED A1C: CPT

## 2019-01-09 PROCEDURE — 93005 ELECTROCARDIOGRAM TRACING: CPT

## 2019-01-09 PROCEDURE — 80048 BASIC METABOLIC PNL TOTAL CA: CPT

## 2019-01-09 PROCEDURE — C1887: CPT

## 2019-01-09 PROCEDURE — 84702 CHORIONIC GONADOTROPIN TEST: CPT

## 2019-01-09 PROCEDURE — 86140 C-REACTIVE PROTEIN: CPT

## 2019-01-09 PROCEDURE — C1769: CPT

## 2019-01-09 PROCEDURE — 97161 PT EVAL LOW COMPLEX 20 MIN: CPT

## 2019-01-09 PROCEDURE — 84443 ASSAY THYROID STIM HORMONE: CPT

## 2019-01-09 PROCEDURE — 85730 THROMBOPLASTIN TIME PARTIAL: CPT

## 2019-01-09 PROCEDURE — 71045 X-RAY EXAM CHEST 1 VIEW: CPT

## 2019-01-09 PROCEDURE — 86901 BLOOD TYPING SEROLOGIC RH(D): CPT

## 2019-01-09 PROCEDURE — 73560 X-RAY EXAM OF KNEE 1 OR 2: CPT

## 2019-01-09 PROCEDURE — 80061 LIPID PANEL: CPT

## 2019-01-09 PROCEDURE — 93306 TTE W/DOPPLER COMPLETE: CPT

## 2019-01-09 PROCEDURE — 84550 ASSAY OF BLOOD/URIC ACID: CPT

## 2019-01-09 PROCEDURE — 86480 TB TEST CELL IMMUN MEASURE: CPT

## 2019-01-09 PROCEDURE — 83735 ASSAY OF MAGNESIUM: CPT

## 2019-01-09 PROCEDURE — 85025 COMPLETE CBC W/AUTO DIFF WBC: CPT

## 2019-01-09 PROCEDURE — 86850 RBC ANTIBODY SCREEN: CPT

## 2019-01-09 PROCEDURE — 36415 COLL VENOUS BLD VENIPUNCTURE: CPT

## 2019-01-09 PROCEDURE — 87040 BLOOD CULTURE FOR BACTERIA: CPT

## 2019-01-09 PROCEDURE — 85027 COMPLETE CBC AUTOMATED: CPT

## 2023-06-05 NOTE — DISCHARGE NOTE ADULT - MEDICATION SUMMARY - MEDICATIONS TO CHANGE
Addended by: LYNDON CANCHOLA on: 6/5/2023 02:28 PM     Modules accepted: Orders    
I will SWITCH the dose or number of times a day I take the medications listed below when I get home from the hospital:    Valtrex 500 mg oral tablet  -- 1 tab(s) by mouth once a day

## 2023-11-08 NOTE — CONSULT NOTE ADULT - CONSULT REQUESTED DATE/TIME
Annual exam    Anthony Bauer is a 33 y.o. presenting for annual exam. She is doing well. Using ocps for birth control and normal cycles. Overall doing great      Ob/Gyn Hx:  G 0P 0 -    LMP - Patient's last menstrual period was 10/20/2023 (approximate).   Menses - regular ;with the following concerns: none  Contraception - ocps  STI - no  SA - yes    Health maintenance:  Pap - 8/2022  Gardasil - done      Past Medical History:   Diagnosis Date    Tawanna-Danlos syndrome     Herpes simplex     MRSA (methicillin resistant staph aureus) culture positive     Musculoskeletal disorder     joint pain    Other ill-defined conditions(799.89)     multiple joint problems    Staph skin infection        Past Surgical History:   Procedure Laterality Date    HEENT      root canal    ORTHOPEDIC SURGERY Left 2013    Labrum repair, hip       Family History   Problem Relation Age of Onset    Diabetes Father     Breast Cancer Paternal Grandmother        Social History     Socioeconomic History    Marital status: Single     Spouse name: Not on file    Number of children: Not on file    Years of education: Not on file    Highest education level: Not on file   Occupational History    Not on file   Tobacco Use    Smoking status: Former    Smokeless tobacco: Current   Substance and Sexual Activity    Alcohol use: Yes    Drug use: No    Sexual activity: Not on file   Other Topics Concern    Not on file   Social History Narrative    Not on file     Social Determinants of Health     Financial Resource Strain: Not on file   Food Insecurity: Not on file   Transportation Needs: Not on file   Physical Activity: Not on file   Stress: Not on file   Social Connections: Not on file   Intimate Partner Violence: Not on file   Housing Stability: Not on file       Current Outpatient Medications   Medication Sig Dispense Refill    drospirenone-ethinyl estradiol 3-0.03 MG TABS Take 1 tablet by mouth daily 1 packet 3    spironolactone (ALDACTONE) 50 MG
15-Nov-2018 17:42
16-Nov-2018 08:00
17-Nov-2018 14:00